# Patient Record
Sex: MALE | Race: BLACK OR AFRICAN AMERICAN | Employment: FULL TIME | URBAN - METROPOLITAN AREA
[De-identification: names, ages, dates, MRNs, and addresses within clinical notes are randomized per-mention and may not be internally consistent; named-entity substitution may affect disease eponyms.]

---

## 2018-04-13 ENCOUNTER — OFFICE VISIT (OUTPATIENT)
Dept: INTERNAL MEDICINE CLINIC | Age: 53
End: 2018-04-13

## 2018-04-13 VITALS
TEMPERATURE: 98.1 F | BODY MASS INDEX: 34.07 KG/M2 | HEIGHT: 69 IN | RESPIRATION RATE: 16 BRPM | HEART RATE: 78 BPM | DIASTOLIC BLOOD PRESSURE: 76 MMHG | SYSTOLIC BLOOD PRESSURE: 138 MMHG | OXYGEN SATURATION: 95 % | WEIGHT: 230 LBS

## 2018-04-13 DIAGNOSIS — Z23 ENCOUNTER FOR IMMUNIZATION: ICD-10-CM

## 2018-04-13 DIAGNOSIS — Z00.00 ROUTINE ADULT HEALTH MAINTENANCE: Primary | ICD-10-CM

## 2018-04-13 DIAGNOSIS — Z12.11 SCREEN FOR COLON CANCER: ICD-10-CM

## 2018-04-13 PROBLEM — E66.9 OBESITY (BMI 30-39.9): Chronic | Status: ACTIVE | Noted: 2018-04-13

## 2018-04-13 PROBLEM — Z80.42 FAMILY HISTORY OF PROSTATE CANCER IN FATHER: Chronic | Status: ACTIVE | Noted: 2018-04-13

## 2018-04-13 PROBLEM — C61 PROSTATE CANCER (HCC): Chronic | Status: RESOLVED | Noted: 2018-04-13 | Resolved: 2018-04-13

## 2018-04-13 PROBLEM — E66.9 OBESITY (BMI 30-39.9): Chronic | Status: RESOLVED | Noted: 2018-04-13 | Resolved: 2018-04-13

## 2018-04-13 PROBLEM — C61 PROSTATE CANCER (HCC): Chronic | Status: ACTIVE | Noted: 2018-04-13

## 2018-04-13 NOTE — LETTER
4/16/2018 3:51 PM 
 
Mr. Sherif Yan [address] [id number] Dear Sherif Yan: 
 
Please find your most recent results below. Resulted Orders CBC WITH AUTOMATED DIFF Result Value Ref Range WBC 3.5 3.4 - 10.8 x10E3/uL  
 RBC 4.20 4. 14 - 5.80 x10E6/uL HGB 12.7 (L) 13.0 - 17.7 g/dL HCT 38.4 37.5 - 51.0 % MCV 91 79 - 97 fL  
 MCH 30.2 26.6 - 33.0 pg  
 MCHC 33.1 31.5 - 35.7 g/dL  
 RDW 13.2 12.3 - 15.4 % PLATELET 108 370 - 394 x10E3/uL NEUTROPHILS 51 Not Estab. % Lymphocytes 38 Not Estab. % MONOCYTES 9 Not Estab. % EOSINOPHILS 1 Not Estab. % BASOPHILS 1 Not Estab. %  
 ABS. NEUTROPHILS 1.7 1.4 - 7.0 x10E3/uL Abs Lymphocytes 1.3 0.7 - 3.1 x10E3/uL  
 ABS. MONOCYTES 0.3 0.1 - 0.9 x10E3/uL  
 ABS. EOSINOPHILS 0.0 0.0 - 0.4 x10E3/uL  
 ABS. BASOPHILS 0.0 0.0 - 0.2 x10E3/uL IMMATURE GRANULOCYTES 0 Not Estab. %  
 ABS. IMM. GRANS. 0.0 0.0 - 0.1 x10E3/uL Narrative Performed at:  [address]  [id number] : Eveline Cortes MD, Phone:  [phone] METABOLIC PANEL, COMPREHENSIVE Result Value Ref Range Glucose 92 65 - 99 mg/dL BUN 17 6 - 24 mg/dL Creatinine 0.84 0.76 - 1.27 mg/dL GFR est non- >59 mL/min/1.73 GFR est  >59 mL/min/1.73  
 BUN/Creatinine ratio 20 9 - 20 Sodium 143 134 - 144 mmol/L Potassium 4.2 3.5 - 5.2 mmol/L Chloride 106 96 - 106 mmol/L  
 CO2 24 18 - 29 mmol/L Calcium 9.0 8.7 - 10.2 mg/dL Protein, total 7.1 6.0 - 8.5 g/dL Albumin 4.2 3.5 - 5.5 g/dL GLOBULIN, TOTAL 2.9 1.5 - 4.5 g/dL A-G Ratio 1.4 1.2 - 2.2 Bilirubin, total 0.5 0.0 - 1.2 mg/dL Alk. phosphatase 90 39 - 117 IU/L  
 AST (SGOT) 15 0 - 40 IU/L  
 ALT (SGPT) 19 0 - 44 IU/L Narrative Performed at:  [address]  [id number] : Eveline Cortes MD, Phone:  [phone] LIPID PANEL  
 Result Value Ref Range Cholesterol, total 174 100 - 199 mg/dL Triglyceride 50 0 - 149 mg/dL HDL Cholesterol 60 >39 mg/dL VLDL, calculated 10 5 - 40 mg/dL LDL, calculated 104 (H) 0 - 99 mg/dL Narrative Performed at:  [address]  [id number] : Viviane Mon MD, Phone:  [phone] TSH 3RD GENERATION Result Value Ref Range TSH 2.160 0.450 - 4.500 uIU/mL Narrative Performed at:  [address]  [id number] : Viviane Mon MD, Phone:  [phone] PSA, DIAGNOSTIC (PROSTATE SPECIFIC AG) Result Value Ref Range Prostate Specific Ag 0.6 0.0 - 4.0 ng/mL Comment:  
   Roche ECLIA methodology. According to the American Urological Association, Serum PSA should 
decrease and remain at undetectable levels after radical 
prostatectomy. The AUA defines biochemical recurrence as an initial 
PSA value 0.2 ng/mL or greater followed by a subsequent confirmatory PSA value 0.2 ng/mL or greater. Values obtained with different assay methods or kits cannot be used 
interchangeably. Results cannot be interpreted as absolute evidence 
of the presence or absence of malignant disease. Narrative Performed at:  [address]  [id number] : Viviane Mon MD, Phone:  [phone] RECOMMENDATIONS: 
Cholesterol levels up just a bit from few years ago, LDL up to 104, had been 98.  No need for meds at this time.  Eating oatmeal on regular basis often helps improve levels. Other labs all look good.  
    
   
 
 
Please call me if you have any questions: [phone] Sincerely, Collin Do III, DO

## 2018-04-13 NOTE — PROGRESS NOTES
Ligia Stephens is a 46 y.o. male who presents for evaluation of cpe. Last seen by me dec 30, 2015. Doing well, no complaints. ROS:  Constitutional: negative for fevers, chills, anorexia and weight loss  Eyes:   negative for visual disturbance and irritation  ENT:   negative for tinnitus,sore throat,nasal congestion,ear pain,hoarseness  Respiratory:  negative for cough, hemoptysis, dyspnea,wheezing  CV:   negative for chest pain, palpitations, lower extremity edema  GI:   negative for nausea, vomiting, diarrhea, abdominal pain,melena  Genitourinary: negative for frequency, dysuria and hematuria  Musculoskel: negative for myalgias, arthralgias, back pain, muscle weakness, joint pain  Neurological:  negative for headaches, dizziness, focal weakness, numbness  Psychiatric:     Negative for depression or anxiety      History reviewed. No pertinent past medical history. Past Surgical History:   Procedure Laterality Date    HX KNEE ARTHROSCOPY         Family History   Problem Relation Age of Onset    Cancer Mother      lung    Cancer Father      Prostate       Social History     Social History    Marital status:      Spouse name: N/A    Number of children: N/A    Years of education: N/A     Occupational History    Not on file.      Social History Main Topics    Smoking status: Never Smoker    Smokeless tobacco: Never Used    Alcohol use No    Drug use: No    Sexual activity: Yes     Other Topics Concern    Not on file     Social History Narrative            Visit Vitals    /76 (BP 1 Location: Left arm, BP Patient Position: Sitting)    Pulse 78    Temp 98.1 °F (36.7 °C) (Oral)    Resp 16    Ht 5' 9.29\" (1.76 m)    Wt 230 lb (104.3 kg)    SpO2 95%    BMI 33.68 kg/m2       Physical Examination:   General - Well appearing male  HEENT - PERRL, TM no erythema/opacification, normal nasal turbinates, no oropharyngeal erythema or exudate, MMM  Neck - supple, no bruits, no thyroidomegaly, no lymphadenopathy  Pulm - clear to auscultation bilaterally  Cardio - RRR, normal S1 S2, no murmur  Abd - soft, nontender, no masses, no HSM  Extrem - no edema, +2 distal pulses  Neuro-  No focal deficits, CN intact     Assessment/Plan:    1. Routine adult health maintenance--check cbc, cmp, flp, tsh, psa.  2.  Screen colon cancer--referral to gi, dr Becky Alvarenga  3. Family hx prostate cancer--in father, check psa    tdap given today. Had flu shot in the fall. rtc one year, sooner if labs abn.         Rosa M Ta III, DO

## 2018-04-13 NOTE — PROGRESS NOTES
Reviewed record in preparation for visit and have obtained necessary documentation. Identified pt with two pt identifiers(name and ). Chief Complaint   Patient presents with    Annual Exam       Health Maintenance Due   Topic Date Due    DTaP/Tdap/Td series (1 - Tdap) 1986    FOBT Q 1 YEAR AGE 50-75  2015    Influenza Age 5 to Adult  2017       Mr. Adolph Mathis has a reminder for a \"due or due soon\" health maintenance. I have asked that he discuss health maintenance topic(s) due with His  primary care provider. Coordination of Care Questionnaire:  :     1) Have you been to an emergency room, urgent care clinic since your last visit? no   Hospitalized since your last visit? no             2) Have you seen or consulted any other health care providers outside of [address] [place] [address] since your last visit? no  (Include any pap smears or colon screenings in this section.)    3) Do you have an Advance Directive on file? no    4) Are you interested in receiving information on Advance Directives? NO    Patient is accompanied by self I have received verbal consent from Nelly Lopez to discuss any/all medical information while they are present in the room.

## 2018-04-13 NOTE — PATIENT INSTRUCTIONS
Colon Cancer Screening: Care Instructions  Your Care Instructions    Colorectal cancer occurs in the colon or rectum. That's the lower part of your digestive system. It is the second-leading cause of cancer deaths in the [place]. It often starts with small growths called polyps in the colon or rectum. Polyps are usually found with screening tests. Depending on the type of test, any polyps found may be removed during the tests. Colorectal cancer usually does not cause symptoms at first. But regular tests can help find it early, before it spreads and becomes harder to treat. Experts advise routine tests for colon cancer for people starting at age 48. And they advise people with a higher risk of colon cancer to get tested sooner. Talk with your doctor about when you should start testing. Discuss which tests you need. Follow-up care is a key part of your treatment and safety. Be sure to make and go to all appointments, and call your doctor if you are having problems. It's also a good idea to know your test results and keep a list of the medicines you take. What are the main screening tests for colon cancer? · Stool tests. These include the fecal immunochemical test (FIT) and the fecal occult blood test (FOBT). These tests check stool samples for signs of cancer. If your test is positive, you will need to have a colonoscopy. · Sigmoidoscopy. This test lets your doctor look at the lining of your rectum and the lowest part of your colon. Your doctor uses a lighted tube called a sigmoidoscope. This test can't find cancers or polyps in the upper part of your colon. In some cases, polyps that are found can be removed. But if your doctor finds polyps, you will need to have a colonoscopy to check the upper part of your colon. · Colonoscopy. This test lets your doctor look at the lining of your rectum and your entire colon. The doctor uses a thin, flexible tool called a colonoscope.  It can also be used to remove polyps or get a tissue sample (biopsy). What tests do you need? The following guidelines are for people age 48 and over who are not at high risk for colorectal cancer. You may have at least one of these tests as directed by your doctor. · Fecal immunochemical test (FIT) or fecal occult blood test (FOBT) every year  · Sigmoidoscopy every 5 years  · Colonoscopy every 10 years  If you are age 68 to 80, you can work with your doctor to decide if screening is a good option. If you are age 80 or older, your doctor will likely advise that screening is not helpful. Talk with your doctor about when you need to be tested. And discuss which tests are right for you. Your doctor may recommend earlier or more frequent testing if you:  · Have had colorectal cancer before. · Have had colon polyps. · Have symptoms of colorectal cancer. These include blood in your stool and changes in your bowel habits. · Have a parent, brother or sister, or child with colon polyps or colorectal cancer. · Have a bowel disease. This includes ulcerative colitis and Crohn's disease. · Have a rare polyp syndrome that runs in families, such as familial adenomatous polyposis (FAP). · Have had radiation treatments to the belly or pelvis. When should you call for help? Watch closely for changes in your health, and be sure to contact your doctor if:  ? · You have any changes in your bowel habits. ? · You have any problems. Where can you learn more? Go to http://rodriguez-king.info/. Enter M541 in the search box to learn more about \"Colon Cancer Screening: Care Instructions. \"  Current as of: May 12, 2017  Content Version: 11.4  © 2271-7062 [employer]. Care instructions adapted under license by [employer] (which disclaims liability or warranty for this information).  If you have questions about a medical condition or this instruction, always ask your healthcare professional. [employer] disclaims any warranty or liability for your use of this information. Well Visit, Men 48 to 72: Care Instructions  Your Care Instructions    Physical exams can help you stay healthy. Your doctor has checked your overall health and may have suggested ways to take good care of yourself. He or she also may have recommended tests. At home, you can help prevent illness with healthy eating, regular exercise, and other steps. Follow-up care is a key part of your treatment and safety. Be sure to make and go to all appointments, and call your doctor if you are having problems. It's also a good idea to know your test results and keep a list of the medicines you take. How can you care for yourself at home? · Reach and stay at a healthy weight. This will lower your risk for many problems, such as obesity, diabetes, heart disease, and high blood pressure. · Get at least 30 minutes of exercise on most days of the week. Walking is a good choice. You also may want to do other activities, such as running, swimming, cycling, or playing tennis or team sports. · Do not smoke. Smoking can make health problems worse. If you need help quitting, talk to your doctor about stop-smoking programs and medicines. These can increase your chances of quitting for good. · Protect your skin from too much sun. When you're outdoors from 10 a.m. to 4 p.m., stay in the shade or cover up with clothing and a hat with a wide brim. Wear sunglasses that block UV rays. Even when it's cloudy, put broad-spectrum sunscreen (SPF 30 or higher) on any exposed skin. · See a dentist one or two times a year for checkups and to have your teeth cleaned. · Wear a seat belt in the car. · Limit alcohol to 2 drinks a day. Too much alcohol can cause health problems. Follow your doctor's advice about when to have certain tests. These tests can spot problems early. · Cholesterol.  Your doctor will tell you how often to have this done based on your overall health and other things that can increase your risk for heart attack and stroke. · Blood pressure. Have your blood pressure checked during a routine doctor visit. Your doctor will tell you how often to check your blood pressure based on your age, your blood pressure results, and other factors. · Prostate exam. Talk to your doctor about whether you should have a blood test (called a PSA test) for prostate cancer. Experts disagree on whether men should have this test. Some experts recommend that you discuss the benefits and risks of the test with your doctor. · Diabetes. Ask your doctor whether you should have tests for diabetes. · Vision. Some experts recommend that you have yearly exams for glaucoma and other age-related eye problems starting at age 48. · Hearing. Tell your doctor if you notice any change in your hearing. You can have tests to find out how well you hear. · Colon cancer. You should begin tests for colon cancer at age 48. You may have one of several tests. Your doctor will tell you how often to have tests based on your age and risk. Risks include whether you already had a precancerous polyp removed from your colon or whether your parent, brother, sister, or child has had colon cancer. · Heart attack and stroke risk. At least every 4 to 6 years, you should have your risk for heart attack and stroke assessed. Your doctor uses factors such as your age, blood pressure, cholesterol, and whether you smoke or have diabetes to show what your risk for a heart attack or stroke is over the next 10 years. · Abdominal aortic aneurysm. Ask your doctor whether you should have a test to check for an aneurysm. You may need a test if you ever smoked or if your parent, brother, sister, or child has had an aneurysm. When should you call for help? Watch closely for changes in your health, and be sure to contact your doctor if you have any problems or symptoms that concern you. Where can you learn more?   Go to http://rodriguez-king.info/. Enter G596 in the search box to learn more about \"Well Visit, Men 48 to 72: Care Instructions. \"  Current as of: May 12, 2017  Content Version: 11.4  © 3061-5985 [employer]. Care instructions adapted under license by [employer] (which disclaims liability or warranty for this information). If you have questions about a medical condition or this instruction, always ask your healthcare professional. [place] [address] any warranty or liability for your use of this information.

## 2018-04-14 LAB
ALBUMIN SERPL-MCNC: 4.2 G/DL (ref 3.5–5.5)
ALBUMIN/GLOB SERPL: 1.4 {RATIO} (ref 1.2–2.2)
ALP SERPL-CCNC: 90 IU/L (ref 39–117)
ALT SERPL-CCNC: 19 IU/L (ref 0–44)
AST SERPL-CCNC: 15 IU/L (ref 0–40)
BASOPHILS # BLD AUTO: 0 X10E3/UL (ref 0–0.2)
BASOPHILS NFR BLD AUTO: 1 %
BILIRUB SERPL-MCNC: 0.5 MG/DL (ref 0–1.2)
BUN SERPL-MCNC: 17 MG/DL (ref 6–24)
BUN/CREAT SERPL: 20 (ref 9–20)
CALCIUM SERPL-MCNC: 9 MG/DL (ref 8.7–10.2)
CHLORIDE SERPL-SCNC: 106 MMOL/L (ref 96–106)
CHOLEST SERPL-MCNC: 174 MG/DL (ref 100–199)
CO2 SERPL-SCNC: 24 MMOL/L (ref 18–29)
CREAT SERPL-MCNC: 0.84 MG/DL (ref 0.76–1.27)
EOSINOPHIL # BLD AUTO: 0 X10E3/UL (ref 0–0.4)
EOSINOPHIL NFR BLD AUTO: 1 %
ERYTHROCYTE [DISTWIDTH] IN BLOOD BY AUTOMATED COUNT: 13.2 % (ref 12.3–15.4)
GFR SERPLBLD CREATININE-BSD FMLA CKD-EPI: 101 ML/MIN/1.73
GFR SERPLBLD CREATININE-BSD FMLA CKD-EPI: 116 ML/MIN/1.73
GLOBULIN SER CALC-MCNC: 2.9 G/DL (ref 1.5–4.5)
GLUCOSE SERPL-MCNC: 92 MG/DL (ref 65–99)
HCT VFR BLD AUTO: 38.4 % (ref 37.5–51)
HDLC SERPL-MCNC: 60 MG/DL
HGB BLD-MCNC: 12.7 G/DL (ref 13–17.7)
IMM GRANULOCYTES # BLD: 0 X10E3/UL (ref 0–0.1)
IMM GRANULOCYTES NFR BLD: 0 %
LDLC SERPL CALC-MCNC: 104 MG/DL (ref 0–99)
LYMPHOCYTES # BLD AUTO: 1.3 X10E3/UL (ref 0.7–3.1)
LYMPHOCYTES NFR BLD AUTO: 38 %
MCH RBC QN AUTO: 30.2 PG (ref 26.6–33)
MCHC RBC AUTO-ENTMCNC: 33.1 G/DL (ref 31.5–35.7)
MCV RBC AUTO: 91 FL (ref 79–97)
MONOCYTES # BLD AUTO: 0.3 X10E3/UL (ref 0.1–0.9)
MONOCYTES NFR BLD AUTO: 9 %
NEUTROPHILS # BLD AUTO: 1.7 X10E3/UL (ref 1.4–7)
NEUTROPHILS NFR BLD AUTO: 51 %
PLATELET # BLD AUTO: 187 X10E3/UL (ref 150–379)
POTASSIUM SERPL-SCNC: 4.2 MMOL/L (ref 3.5–5.2)
PROT SERPL-MCNC: 7.1 G/DL (ref 6–8.5)
PSA SERPL-MCNC: 0.6 NG/ML (ref 0–4)
RBC # BLD AUTO: 4.2 X10E6/UL (ref 4.14–5.8)
SODIUM SERPL-SCNC: 143 MMOL/L (ref 134–144)
TRIGL SERPL-MCNC: 50 MG/DL (ref 0–149)
TSH SERPL DL<=0.005 MIU/L-ACNC: 2.16 UIU/ML (ref 0.45–4.5)
VLDLC SERPL CALC-MCNC: 10 MG/DL (ref 5–40)
WBC # BLD AUTO: 3.5 X10E3/UL (ref 3.4–10.8)

## 2018-04-15 NOTE — PROGRESS NOTES
Cholesterol levels up just a bit from few years ago, LDL up to 104, had been 98. No need for meds at this time. Eating oatmeal on regular basis often helps improve levels. Other labs all look good.

## 2018-04-16 NOTE — PROGRESS NOTES
I have attempted to contact this patient by phone with the following results:   Cholesterol levels up just a bit from few years ago, LDL up to 104, had been 98.  No need for meds at this time.  Eating oatmeal on regular basis often helps improve levels.    Other labs all look good.       Results mailed to patient's home.

## 2019-02-10 ENCOUNTER — APPOINTMENT (OUTPATIENT)
Dept: CT IMAGING | Age: 54
End: 2019-02-10
Attending: EMERGENCY MEDICINE
Payer: COMMERCIAL

## 2019-02-10 ENCOUNTER — HOSPITAL ENCOUNTER (EMERGENCY)
Age: 54
Discharge: HOME | End: 2019-02-10
Attending: EMERGENCY MEDICINE
Payer: COMMERCIAL

## 2019-02-10 VITALS
HEART RATE: 72 BPM | BODY MASS INDEX: 32.7 KG/M2 | TEMPERATURE: 97.9 F | HEIGHT: 70 IN | RESPIRATION RATE: 12 BRPM | WEIGHT: 228.4 LBS | OXYGEN SATURATION: 100 % | SYSTOLIC BLOOD PRESSURE: 135 MMHG | DIASTOLIC BLOOD PRESSURE: 100 MMHG

## 2019-02-10 DIAGNOSIS — R31.9 HEMATURIA, UNSPECIFIED TYPE: Primary | ICD-10-CM

## 2019-02-10 LAB
ALBUMIN SERPL-MCNC: 3.8 G/DL (ref 3.5–5)
ALBUMIN/GLOB SERPL: 1 {RATIO} (ref 1.1–2.2)
ALP SERPL-CCNC: 97 U/L (ref 45–117)
ALT SERPL-CCNC: 31 U/L (ref 12–78)
ANION GAP SERPL CALC-SCNC: 6 MMOL/L (ref 5–15)
APPEARANCE UR: ABNORMAL
AST SERPL-CCNC: 13 U/L (ref 15–37)
BACTERIA URNS QL MICRO: NEGATIVE /HPF
BASOPHILS # BLD: 0 K/UL (ref 0–0.1)
BASOPHILS NFR BLD: 1 % (ref 0–1)
BILIRUB SERPL-MCNC: 0.9 MG/DL (ref 0.2–1)
BILIRUB UR QL: NEGATIVE
BUN SERPL-MCNC: 14 MG/DL (ref 6–20)
BUN/CREAT SERPL: 18 (ref 12–20)
CALCIUM SERPL-MCNC: 8.7 MG/DL (ref 8.5–10.1)
CHLORIDE SERPL-SCNC: 105 MMOL/L (ref 97–108)
CO2 SERPL-SCNC: 27 MMOL/L (ref 21–32)
COLOR UR: ABNORMAL
CREAT SERPL-MCNC: 0.8 MG/DL (ref 0.7–1.3)
DIFFERENTIAL METHOD BLD: ABNORMAL
EOSINOPHIL # BLD: 0 K/UL (ref 0–0.4)
EOSINOPHIL NFR BLD: 1 % (ref 0–7)
EPITH CASTS URNS QL MICRO: ABNORMAL /LPF
ERYTHROCYTE [DISTWIDTH] IN BLOOD BY AUTOMATED COUNT: 11.9 % (ref 11.5–14.5)
GLOBULIN SER CALC-MCNC: 3.8 G/DL (ref 2–4)
GLUCOSE SERPL-MCNC: 89 MG/DL (ref 65–100)
GLUCOSE UR STRIP.AUTO-MCNC: NEGATIVE MG/DL
HCT VFR BLD AUTO: 38.7 % (ref 36.6–50.3)
HGB BLD-MCNC: 13.5 G/DL (ref 12.1–17)
HGB UR QL STRIP: ABNORMAL
IMM GRANULOCYTES # BLD AUTO: 0 K/UL (ref 0–0.04)
IMM GRANULOCYTES NFR BLD AUTO: 0 % (ref 0–0.5)
KETONES UR QL STRIP.AUTO: NEGATIVE MG/DL
LEUKOCYTE ESTERASE UR QL STRIP.AUTO: NEGATIVE
LYMPHOCYTES # BLD: 1.2 K/UL (ref 0.8–3.5)
LYMPHOCYTES NFR BLD: 36 % (ref 12–49)
MCH RBC QN AUTO: 31.2 PG (ref 26–34)
MCHC RBC AUTO-ENTMCNC: 34.9 G/DL (ref 30–36.5)
MCV RBC AUTO: 89.4 FL (ref 80–99)
MONOCYTES # BLD: 0.2 K/UL (ref 0–1)
MONOCYTES NFR BLD: 6 % (ref 5–13)
NEUTS SEG # BLD: 1.9 K/UL (ref 1.8–8)
NEUTS SEG NFR BLD: 57 % (ref 32–75)
NITRITE UR QL STRIP.AUTO: NEGATIVE
NRBC # BLD: 0 K/UL (ref 0–0.01)
NRBC BLD-RTO: 0 PER 100 WBC
PH UR STRIP: 5 [PH] (ref 5–8)
PLATELET # BLD AUTO: 169 K/UL (ref 150–400)
PMV BLD AUTO: 10 FL (ref 8.9–12.9)
POTASSIUM SERPL-SCNC: 4.2 MMOL/L (ref 3.5–5.1)
PROT SERPL-MCNC: 7.6 G/DL (ref 6.4–8.2)
PROT UR STRIP-MCNC: 100 MG/DL
RBC # BLD AUTO: 4.33 M/UL (ref 4.1–5.7)
RBC #/AREA URNS HPF: >100 /HPF (ref 0–5)
SODIUM SERPL-SCNC: 138 MMOL/L (ref 136–145)
SP GR UR REFRACTOMETRY: 1.01 (ref 1–1.03)
UA: UC IF INDICATED,UAUC: ABNORMAL
UROBILINOGEN UR QL STRIP.AUTO: 0.2 EU/DL (ref 0.2–1)
WBC # BLD AUTO: 3.4 K/UL (ref 4.1–11.1)
WBC URNS QL MICRO: ABNORMAL /HPF (ref 0–4)

## 2019-02-10 PROCEDURE — 74011250636 HC RX REV CODE- 250/636: Performed by: EMERGENCY MEDICINE

## 2019-02-10 PROCEDURE — 85025 COMPLETE CBC W/AUTO DIFF WBC: CPT

## 2019-02-10 PROCEDURE — 36415 COLL VENOUS BLD VENIPUNCTURE: CPT

## 2019-02-10 PROCEDURE — 96361 HYDRATE IV INFUSION ADD-ON: CPT

## 2019-02-10 PROCEDURE — 74177 CT ABD & PELVIS W/CONTRAST: CPT

## 2019-02-10 PROCEDURE — 74011636320 HC RX REV CODE- 636/320: Performed by: EMERGENCY MEDICINE

## 2019-02-10 PROCEDURE — 99283 EMERGENCY DEPT VISIT LOW MDM: CPT

## 2019-02-10 PROCEDURE — 81001 URINALYSIS AUTO W/SCOPE: CPT

## 2019-02-10 PROCEDURE — 80053 COMPREHEN METABOLIC PANEL: CPT

## 2019-02-10 PROCEDURE — 96360 HYDRATION IV INFUSION INIT: CPT

## 2019-02-10 RX ORDER — CEPHALEXIN 500 MG/1
500 CAPSULE ORAL 3 TIMES DAILY
Qty: 15 CAP | Refills: 0 | Status: SHIPPED | OUTPATIENT
Start: 2019-02-10 | End: 2022-10-14

## 2019-02-10 RX ORDER — CEPHALEXIN 500 MG/1
500 CAPSULE ORAL 3 TIMES DAILY
Qty: 15 CAP | Refills: 0 | Status: SHIPPED | OUTPATIENT
Start: 2019-02-10 | End: 2019-02-10

## 2019-02-10 RX ORDER — SODIUM CHLORIDE 0.9 % (FLUSH) 0.9 %
10 SYRINGE (ML) INJECTION
Status: COMPLETED | OUTPATIENT
Start: 2019-02-10 | End: 2019-02-10

## 2019-02-10 RX ORDER — SODIUM CHLORIDE 0.9 % (FLUSH) 0.9 %
5-40 SYRINGE (ML) INJECTION EVERY 8 HOURS
Status: DISCONTINUED | OUTPATIENT
Start: 2019-02-10 | End: 2019-02-10 | Stop reason: HOSPADM

## 2019-02-10 RX ORDER — SODIUM CHLORIDE 0.9 % (FLUSH) 0.9 %
5-40 SYRINGE (ML) INJECTION AS NEEDED
Status: DISCONTINUED | OUTPATIENT
Start: 2019-02-10 | End: 2019-02-10 | Stop reason: HOSPADM

## 2019-02-10 RX ADMIN — Medication 10 ML: at 14:16

## 2019-02-10 RX ADMIN — SODIUM CHLORIDE 1000 ML: 900 INJECTION, SOLUTION INTRAVENOUS at 13:47

## 2019-02-10 RX ADMIN — IOPAMIDOL 100 ML: 755 INJECTION, SOLUTION INTRAVENOUS at 14:16

## 2019-02-10 NOTE — ED PROVIDER NOTES
EMERGENCY DEPARTMENT HISTORY AND PHYSICAL EXAM      Date: 2/10/2019  Patient Name: Renetta Cueva    History of Presenting Illness     Chief Complaint   Patient presents with    Blood in Urine     Pt reports blood in his urine since yesterday       History Provided By: Patient    HPI: Renetta Cueva, 48 y.o. male with no significant PMHx, presents ambulatory to the ED with cc of hematuria since yesterday. Patient denies dysuria, fever, chills, NVD, abdominal pain, testicular pain, testicular swelling, rectal pain, blood thinner use, or FHx of renal cell carcinoma. He reports he went to urgent care yesterday for same complaint and was sent home. There are no other complaints, changes, or physical findings at this time. PCP: Kiki Srivastava, DO    No current facility-administered medications on file prior to encounter. Current Outpatient Medications on File Prior to Encounter   Medication Sig Dispense Refill    omega 3-dha-epa-fish oil (FISH OIL) 100-160-1,000 mg cap Take  by mouth.  MULTIVITAMIN PO Take  by mouth. Past History     Past Medical History:  History reviewed. No pertinent past medical history. Past Surgical History:  Past Surgical History:   Procedure Laterality Date    HX KNEE ARTHROSCOPY         Family History:  Family History   Problem Relation Age of Onset    Cancer Mother         lung    Cancer Father         Prostate       Social History:  Social History     Tobacco Use    Smoking status: Never Smoker    Smokeless tobacco: Never Used   Substance Use Topics    Alcohol use: No    Drug use: No       Allergies:  No Known Allergies      Review of Systems   Review of Systems   Constitutional: Negative. Negative for appetite change, chills, fatigue and fever. HENT: Negative. Negative for congestion, rhinorrhea, sinus pressure and sore throat. Eyes: Negative. Respiratory: Negative.   Negative for cough, choking, chest tightness, shortness of breath and wheezing. Cardiovascular: Negative. Negative for chest pain, palpitations and leg swelling. Gastrointestinal: Negative for abdominal pain, constipation, diarrhea, nausea and vomiting. Endocrine: Negative. Genitourinary: Positive for hematuria. Negative for difficulty urinating, dysuria, flank pain and urgency. Musculoskeletal: Negative. Skin: Negative. Neurological: Negative. Negative for dizziness, speech difficulty, weakness, light-headedness, numbness and headaches. Psychiatric/Behavioral: Negative. All other systems reviewed and are negative. Physical Exam   Physical Exam   Constitutional: He is oriented to person, place, and time. He appears well-developed and well-nourished. No distress. HENT:   Head: Normocephalic and atraumatic. Mouth/Throat: Oropharynx is clear and moist. No oropharyngeal exudate. Eyes: Conjunctivae and EOM are normal. Pupils are equal, round, and reactive to light. Neck: Normal range of motion. Neck supple. No JVD present. No tracheal deviation present. Cardiovascular: Normal rate, regular rhythm, normal heart sounds and intact distal pulses. No murmur heard. Pulmonary/Chest: Effort normal and breath sounds normal. No stridor. No respiratory distress. He has no wheezes. He has no rales. He exhibits no tenderness. Abdominal: Soft. He exhibits no distension. There is no tenderness. There is no rebound and no guarding. Musculoskeletal: Normal range of motion. He exhibits no edema or tenderness. Neurological: He is alert and oriented to person, place, and time. No cranial nerve deficit. No gross motor or sensory deficits    Skin: Skin is warm and dry. He is not diaphoretic. Psychiatric: He has a normal mood and affect. His behavior is normal.   Nursing note and vitals reviewed.       Diagnostic Study Results     Labs -     Recent Results (from the past 12 hour(s))   URINALYSIS W/ REFLEX CULTURE    Collection Time: 02/10/19 11:23 AM   Result Value Ref Range    Color RED      Appearance TURBID (A) CLEAR      Specific gravity 1.015 1.003 - 1.030      pH (UA) 5.0 5.0 - 8.0      Protein 100 (A) NEG mg/dL    Glucose NEGATIVE  NEG mg/dL    Ketone NEGATIVE  NEG mg/dL    Bilirubin NEGATIVE  NEG      Blood LARGE (A) NEG      Urobilinogen 0.2 0.2 - 1.0 EU/dL    Nitrites NEGATIVE  NEG      Leukocyte Esterase NEGATIVE  NEG      WBC 0-4 0 - 4 /hpf    RBC >100 (H) 0 - 5 /hpf    Epithelial cells FEW FEW /lpf    Bacteria NEGATIVE  NEG /hpf    UA:UC IF INDICATED CULTURE NOT INDICATED BY UA RESULT CNI     CBC WITH AUTOMATED DIFF    Collection Time: 02/10/19  1:04 PM   Result Value Ref Range    WBC 3.4 (L) 4.1 - 11.1 K/uL    RBC 4.33 4.10 - 5.70 M/uL    HGB 13.5 12.1 - 17.0 g/dL    HCT 38.7 36.6 - 50.3 %    MCV 89.4 80.0 - 99.0 FL    MCH 31.2 26.0 - 34.0 PG    MCHC 34.9 30.0 - 36.5 g/dL    RDW 11.9 11.5 - 14.5 %    PLATELET 735 839 - 453 K/uL    MPV 10.0 8.9 - 12.9 FL    NRBC 0.0 0  WBC    ABSOLUTE NRBC 0.00 0.00 - 0.01 K/uL    NEUTROPHILS 57 32 - 75 %    LYMPHOCYTES 36 12 - 49 %    MONOCYTES 6 5 - 13 %    EOSINOPHILS 1 0 - 7 %    BASOPHILS 1 0 - 1 %    IMMATURE GRANULOCYTES 0 0.0 - 0.5 %    ABS. NEUTROPHILS 1.9 1.8 - 8.0 K/UL    ABS. LYMPHOCYTES 1.2 0.8 - 3.5 K/UL    ABS. MONOCYTES 0.2 0.0 - 1.0 K/UL    ABS. EOSINOPHILS 0.0 0.0 - 0.4 K/UL    ABS. BASOPHILS 0.0 0.0 - 0.1 K/UL    ABS. IMM.  GRANS. 0.0 0.00 - 0.04 K/UL    DF AUTOMATED     METABOLIC PANEL, COMPREHENSIVE    Collection Time: 02/10/19  1:04 PM   Result Value Ref Range    Sodium 138 136 - 145 mmol/L    Potassium 4.2 3.5 - 5.1 mmol/L    Chloride 105 97 - 108 mmol/L    CO2 27 21 - 32 mmol/L    Anion gap 6 5 - 15 mmol/L    Glucose 89 65 - 100 mg/dL    BUN 14 6 - 20 MG/DL    Creatinine 0.80 0.70 - 1.30 MG/DL    BUN/Creatinine ratio 18 12 - 20      GFR est AA >60 >60 ml/min/1.73m2    GFR est non-AA >60 >60 ml/min/1.73m2    Calcium 8.7 8.5 - 10.1 MG/DL    Bilirubin, total 0.9 0.2 - 1.0 MG/DL    ALT (SGPT) 31 12 - 78 U/L    AST (SGOT) 13 (L) 15 - 37 U/L    Alk. phosphatase 97 45 - 117 U/L    Protein, total 7.6 6.4 - 8.2 g/dL    Albumin 3.8 3.5 - 5.0 g/dL    Globulin 3.8 2.0 - 4.0 g/dL    A-G Ratio 1.0 (L) 1.1 - 2.2         Radiologic Studies -   CT ABD PELV W CONT   Final Result   IMPRESSION:   No evidence of acute process. CT Results  (Last 48 hours)               02/10/19 1417  CT ABD PELV W CONT Final result    Impression:  IMPRESSION:   No evidence of acute process. Narrative:  EXAM: CT ABD PELV W CONT       INDICATION: painless hematuria       COMPARISON: None        CONTRAST: 100 mL of Isovue-370. TECHNIQUE:    Following the uneventful intravenous administration of contrast, thin axial   images were obtained through the abdomen and pelvis. Coronal and sagittal   reconstructions were generated. Oral contrast was not administered. CT dose   reduction was achieved through use of a standardized protocol tailored for this   examination and automatic exposure control for dose modulation. FINDINGS:    LUNG BASES: Clear. INCIDENTALLY IMAGED HEART AND MEDIASTINUM: Unremarkable. LIVER: No mass or biliary dilatation. GALLBLADDER: Unremarkable. SPLEEN: No mass. PANCREAS: No mass or ductal dilatation. ADRENALS: Unremarkable. KIDNEYS: Several small hypodensities in the kidneys are less than 5 mm in size   likely represent incidental small cysts. No mass, calculus, or hydronephrosis. STOMACH: Unremarkable. SMALL BOWEL: No dilatation or wall thickening. COLON: No dilatation or wall thickening. APPENDIX: Unremarkable. PERITONEUM: No ascites or pneumoperitoneum. RETROPERITONEUM: No lymphadenopathy or aortic aneurysm. REPRODUCTIVE ORGANS: Unremarkable   URINARY BLADDER: No mass or calculus. BONES: A small focus of avascular necrosis is seen in the anterior superior   right femoral head.    ADDITIONAL COMMENTS: N/A               CXR Results  (Last 48 hours)    None            Medical Decision Making   I am the first provider for this patient. I reviewed the vital signs, available nursing notes, past medical history, past surgical history, family history and social history. Vital Signs-Reviewed the patient's vital signs. Patient Vitals for the past 12 hrs:   Temp Pulse Resp BP SpO2   02/10/19 1430 -- -- -- (!) 135/100 100 %   02/10/19 1232 -- -- -- -- 100 %   02/10/19 1117 97.9 °F (36.6 °C) 72 12 144/86 100 %       Pulse Oximetry Analysis - 100% on RA    Cardiac Monitor:   Rate: 72 bpm  Rhythm: Normal Sinus Rhythm      Records Reviewed: Nursing Notes, Old medical records    Provider Notes (Medical Decision Making):   DDx: UTI, renal cell carcinoma, bladder cancer    ED Course:   Initial assessment performed. The patients presenting problems have been discussed, and they are in agreement with the care plan formulated and outlined with them. I have encouraged them to ask questions as they arise throughout their visit. Critical Care Time:   0 minutes    Disposition:  DISCHARGE NOTE:  Discussed with pt f/u with Urology, given gross hematuria without source. No sig mass seen of kidney's on CT. Pt does perform heavy lifting at work however in [address] there are red cells present as well. Urine here cloudy not gross bloody and seems to be improving. Ua results from [facility] does have WBCs as well, will write Rx for Ab to cover for UTI. The patient is ready for discharge. The patients signs, symptoms, diagnosis, and instructions for discharge have been discussed and the pt has conveyed their understanding. The patient is to follow up as recommended or return to the ER should their symptoms worsen. Plan has been discussed and patient has conveyed their agreement. PLAN:  1. Current Discharge Medication List        2. Follow-up Information    None       Return to ED if worse     Diagnosis     Clinical Impression: No diagnosis found. Attestations:     This note is prepared by Sandip Eh Tavarez, acting as Scribe for DO Army Stefanie Plata DO: The scribe's documentation has been prepared under my direction and personally reviewed by me in its entirety. I confirm that the note above accurately reflects all work, treatment, procedures, and medical decision making performed by me.

## 2019-02-10 NOTE — ED NOTES
Pt discharged by Dr Valeria Phoenix. Pt provided with discharge instructions Rx and instructions on follow up care. Pt out of ED ambulatory without difficulty accompanied by family.

## 2019-02-10 NOTE — DISCHARGE INSTRUCTIONS
Patient Education        Blood in the Urine: Care Instructions  Your Care Instructions    Blood in the urine, or hematuria, may make the urine look red, brown, or pink. There may be blood every time you urinate or just from time to time. You cannot always see blood in the urine, but it will show up in a urine test.  Blood in the urine may be serious. It should always be checked by a doctor. Your doctor may recommend more tests, including an X-ray, a CT scan, or a cystoscopy (which lets a doctor look inside the urethra and bladder). Blood in the urine can be a sign of another problem. Common causes are bladder infections and kidney stones. An injury to your groin or your genital area can also cause bleeding in the urinary tract. Very hard exercise--such as running a marathon--can cause blood in the urine. Blood in the urine can also be a sign of kidney disease or cancer in the bladder or kidney. Many cases of blood in the urine are caused by a harmless condition that runs in families. This is called benign familial hematuria. It does not need any treatment. Sometimes your urine may look red or brown even though it does not contain blood. For example, not getting enough fluids (dehydration), taking certain medicines, or having a liver problem can change the color of your urine. Eating foods such as beets, rhubarb, or blackberries or foods with red food coloring can make your urine look red or pink. Follow-up care is a key part of your treatment and safety. Be sure to make and go to all appointments, and call your doctor if you are having problems. It's also a good idea to know your test results and keep a list of the medicines you take. When should you call for help? Call your doctor now or seek immediate medical care if:    · You have symptoms of a urinary infection. For example:  ? You have pus in your urine. ? You have pain in your back just below your rib cage. This is called flank pain. ?  You have a fever, chills, or body aches. ? It hurts to urinate. ? You have groin or belly pain.     · You have more blood in your urine.    Watch closely for changes in your health, and be sure to contact your doctor if:    · You have new urination problems.     · You do not get better as expected. Where can you learn more? Go to http://rodriguez-king.info/. Enter A686 in the search box to learn more about \"Blood in the Urine: Care Instructions. \"  Current as of: March 20, 2018  Content Version: 11.9  © 1854-0225 [employer]. Care instructions adapted under license by [employer] (which disclaims liability or warranty for this information). If you have questions about a medical condition or this instruction, always ask your healthcare professional. [address] any warranty or liability for your use of this information.

## 2022-03-18 PROBLEM — Z80.42 FAMILY HISTORY OF PROSTATE CANCER IN FATHER: Status: ACTIVE | Noted: 2018-04-13

## 2022-04-15 ENCOUNTER — OFFICE VISIT (OUTPATIENT)
Dept: ORTHOPEDIC SURGERY | Age: 57
End: 2022-04-15
Payer: OTHER MISCELLANEOUS

## 2022-04-15 VITALS — HEIGHT: 70 IN | BODY MASS INDEX: 32.21 KG/M2 | WEIGHT: 225 LBS

## 2022-04-15 DIAGNOSIS — G56.23 CUBITAL TUNNEL SYNDROME OF BOTH UPPER EXTREMITIES: ICD-10-CM

## 2022-04-15 DIAGNOSIS — R20.0 BILATERAL HAND NUMBNESS: Primary | ICD-10-CM

## 2022-04-15 DIAGNOSIS — M65.342 TRIGGER FINGER, LEFT RING FINGER: ICD-10-CM

## 2022-04-15 PROCEDURE — 99203 OFFICE O/P NEW LOW 30 MIN: CPT | Performed by: ORTHOPAEDIC SURGERY

## 2022-04-15 NOTE — PROGRESS NOTES
Chief Complaint   Patient presents with    Hand Pain     On 1/10/22 while working with chris knife started having pain, numbness, & stiffness in bilateral little fingers, Was sent to [facility] after incident

## 2022-04-15 NOTE — PROGRESS NOTES
Lorena Sun (: 1965) is a 64 y.o. male patient here for evaluation of the following chief complaint(s):  Hand Pain (On 1/10/22 while working with chris knife started having pain, numbness, & stiffness in bilateral little fingers, Was sent to [facility] after incident)       ASSESSMENT/PLAN:  Below is the assessment and plan developed based on review of pertinent history, physical exam, labs, studies, and medications. 1. Bilateral hand numbness  2. Cubital tunnel syndrome of both upper extremities  -     EMG TWO EXTREMITIES UPPER; Future      I discussed treatment options with the patient and he does have Shore grade 3 changes on his exam.  We discussed the need for upper extremity EMG to evaluate the severity and potentially monitor for progress postoperatively. He will follow-up after that test to discuss further treatment options. For the left ring trigger finger we discussed steroid injection or surgical release at the time of surgery of his cubital tunnel. Patient verbalized understanding and elected to proceed. All questions were answered to the patient's apparent satisfaction. 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 He states that since then he has had numbness and tingling in the bilateral hands as well as weakness in the hands and numbness and stiffness. He is also noticed over the last few months his left ring finger will lock and he has to use the other digits to unlock it. He states that this all began around January 10, 2022 and did not notice symptoms prior to this. Patient reports a sudden onset of symptoms. Duration of problem 3 months, began 1/10/2022.   Symptom Severity 7/10  Symptom Frequency constant        No Known Allergies    Current Outpatient Medications   Medication Sig    omega 3-dha-epa-fish oil (FISH OIL) 100-160-1,000 mg cap Take  by mouth. (Patient not taking: Reported on 4/15/2022)    cephALEXin (KEFLEX) 500 mg capsule Take 1 Cap by mouth three (3) times daily. (Patient not taking: Reported on 4/15/2022)    MULTIVITAMIN PO Take  by mouth. (Patient not taking: Reported on 4/15/2022)     No current facility-administered medications for this visit. Social History     Socioeconomic History    Marital status:      Spouse name: Not on file    Number of children: Not on file    Years of education: Not on file    Highest education level: Not on file   Occupational History    Not on file   Tobacco Use    Smoking status: Never Smoker    Smokeless tobacco: Never Used   Substance and Sexual Activity    Alcohol use: No    Drug use: No    Sexual activity: Yes   Other Topics Concern    Not on file   Social History Narrative    Not on file     Social Determinants of Health     Financial Resource Strain:     Difficulty of Paying Living Expenses: Not on file   Food Insecurity:     Worried About Running Out of Food in the Last Year: Not on file    Niles of Food in the Last Year: Not on file   Transportation Needs:     Lack of Transportation (Medical): Not on file    Lack of Transportation (Non-Medical):  Not on file   Physical Activity:     Days of Exercise per Week: Not on file    Minutes of Exercise per Session: Not on file   Stress:     Feeling of Stress : Not on file   Social Connections:     Frequency of Communication with Friends and Family: Not on file    Frequency of Social Gatherings with Friends and Family: Not on file    Attends Quaker Services: Not on file    Active Member of Clubs or Organizations: Not on file    Attends Club or Organization Meetings: Not on file    Marital Status: Not on file   Intimate Partner Violence:     Fear of Current or Ex-Partner: Not on file    Emotionally Abused: Not on file    Physically Abused: Not on file    Sexually Abused: Not on file Housing Stability:     Unable to Pay for Housing in the Last Year: Not on file    Number of Places Lived in the Last Year: Not on file    Unstable Housing in the Last Year: Not on file       Past Surgical History:   Procedure Laterality Date    HX KNEE ARTHROSCOPY         Family History   Problem Relation Age of Onset    Cancer Mother         lung    Cancer Father         Prostate            Review of Systems    No flowsheet data found. Vitals:  Ht 5' 10\" (1.778 m)   Wt 225 lb (102.1 kg)   BMI 32.28 kg/m²    Estimated body surface area is 2.25 meters squared as calculated from the following:    Height as of this encounter: 5' 10\" (1.778 m). Weight as of this encounter: 225 lb (102.1 kg). Body mass index is 32.28 kg/m². Physical Exam    Musculoskeletal Exam:    Bilateral NEURO EXAM:    Phalen's: Negative    MNCT: Negative    Thenar Atrophy: none    Tinel's MN: Negative    APB STRENGTH: 5/5    1st DI Strength: 3/5      Elbow Flexion Test: Positive    UNCT Elbow: Positive    UNCT Palm: Negative    Ring/Small Clawing: Positive    Froment Sign: Positive    Tinel's UN: Positive    Patient has severe interosseous wasting bilaterally. He has tenderness over the left ring finger A1 pulley and locking on examination. Patient fires AIN, PIN and ulnar nerves. Sensation is grossly intact in the median, radial and severely diminished in the ulnar distribution. Hand is pink and appears well-perfused. Hand is warm. Skin is intact. Compartments are soft and compressible.         Consitutional: Healthy  Skin:   - Edema - none  - Cellulitis - No    Neuro: Numbness or tingling in R/L arm: Yes    Psych: Affect normal    Cardiovascular: Capillary Refill < 2 seconds in upper extremities    Respiratory: Non-Labored Breathing    ROS:    Constitutional: Denies fever/chills    Respiratory: Denies SOB              Orders Placed This Encounter    EMG TWO EXTREMITIES UPPER     Standing Status:   Future Standing Expiration Date:   10/15/2022     Order Specific Question:   Reason for Exam:     Answer:   cubital tunnel syndrome bilateral          An electronic signature was used to authenticate this note.   -- Deb Edwards MD

## 2022-04-15 NOTE — PATIENT INSTRUCTIONS
From the [id number]    Cubital Tunnel Syndrome    Cubital Tunnel Syndrome is a condition that involves pressure or stretching of the ulnar nerve (also known as the funny bone nerve), which can cause numbness or tingling in the ring and small fingers, pain in the forearm, and/or weakness in the hand. The ulnar nerve (Figure 1) runs in a groove on the inner side of the elbow. Figure 1  Ulnar nerve at elbow joint (inner side of elbow), which is involved in cubital tunnel syndrome        Causes    There are a few causes of this ulnar nerve problem. These include:    Pressure: The nerve has little padding over it. Direct pressure (like leaning the arm on an arm rest) can press the nerve, causing the arm and hand -- especially the ring and small fingers -- to fall asleep.     Stretching: Keeping the elbow bent for a long time can stretch the nerve behind the elbow. This can happen during sleep. Anatomy: Sometimes, the ulnar nerve does not stay in its place and snaps back and forth over a bony bump as the elbow is moved. Repeated snapping can irritate the nerve. Sometimes, the soft tissues over the nerve become thicker or there is an extra muscle over the nerve that can keep it from working correctly. Signs and Symptoms    Cubital tunnel syndrome can cause pain, loss of sensation, tingling and/or weakness. Pins and needles usually are felt in the ring and small fingers. These symptoms are often felt when the elbow is bent for a long period of time, such as while holding a phone or while sleeping. Some people feel weak or clumsy. Diagnosis    Your doctor can learn much by asking you about your symptoms and examining you. S/he might test you for other medical problems like diabetes or thyroid disease. Sometimes, nerve testing (EMG/NCS) may be needed to see how much the nerve and muscle are being affected.  This test also checks for other problems such as a pinched nerve in the neck, which can cause similar symptoms. Treatment    The first treatment is to avoid actions that cause symptoms. Wrapping a pillow or towel loosely around the elbow or wearing a splint at night to keep the elbow from bending can help. Avoiding leaning on the funny bone can also help. A hand therapist can help you find ways to avoid pressure on the nerve. Sometimes, surgery may be needed to relieve the pressure on the nerve. This can involve releasing the nerve, moving the nerve to the front of the elbow, and/or removing a part of the bone. Your surgeon will talk to you about options. Therapy is sometimes needed after surgery, and the time it takes to recover can vary. Numbness and tingling may improve quickly or slowly. It may take many months for recovery after surgery. Cubital tunnel symptoms may not totally go away after surgery, especially if symptoms are severe. From the [employer]    Trigger Finger    What is Trigger Finger? A trigger finger is a very common and treatable problem. It can occur in both fingers and the thumbs, which have tendons that help them to bend. The flexor tendons that bend the fingers have a lining on the outside. This lining is called tenosynovium. The tendon and lining are covered by a series of thick, soft tissue called pulleys. The tendon and its lining are designed to glide through the pulleys without friction. The pulleys are similar to how a line is held on a fishing marc (Figure 1). A trigger finger, sometimes referred to as a trigger thumb or stenosing tenosynovitis, can occur if one of three things happen: 1. The tendon enlarges (does not fit through pulley well); 2. The lining increases in thickness (does not fit through pulley well); 3. the pulley becomes thicker (the opening for the tendon gets smaller). The finger tendon and pulley system is designed to have the exact right sizes of each structure.  The change in size of any of the important finger structures can cause problems. If the tendon becomes tight within the pulley, the lining gets squeezed and reacts with thickening. The bigger lining then produces more fluid. And the higher volume of fluid increases pressure. The undersurface of the pulley can also change and thicken. This thicker pulley causes friction on the moving tendon. This makes it difficult for the tendon to move back and forth (Figure 2). The good news is that trigger finger can be diagnosed by the history, symptoms, and a physical exam. It is rare to require other diagnostic testing. It is also helpful to know this problem has several very successful treatments. Figure 1        The pulley and tendon in a finger, gliding normally. Figure 2        As in trigger finger, if the pulley becomes too thick, the tendon cannot glide through it. Causes  Trigger fingers are more common with certain medical conditions. Rheumatoid arthritis, gout and diabetes are risk factors for this condition. Repeated and strong gripping may lead to the condition. In most cases, the cause of the trigger finger is not known. Signs and Symptoms    Some symptoms of trigger finger can include:    Pain: Trigger finger may start with discomfort felt at the base of the affected finger or thumb, where the finger joins the palm. This may be the only initial symptom. This pain occurs with pressure over the A1 pulley area. The pain is often only present with activity such as gripping. When at rest, it may not hurt. Over time, if there is increased fluid production in the tendon sheath, this may cause pressure and pain even without hand use. Swelling: Over time there may be the development of a lump at the A1 pulley. This can be due to a nodular swelling within the tendon or the development of a fluid filled cyst. The cyst is called a flexor sheath ganglion.     Stiffness or loss of motion: A trigger finger may result in loss of the ability to bend the finger. This can be estimated by how far the tip of the finger is from the palm of the hand when the patient is asked to bend the finger as much as they can. This is most common in chronic, untreated trigger fingers. It can be painful to try and bend the finger due to the compression of the fluid. Over time, the person may start to avoid a bent position of the finger to limit pain. Trigger fingers can also result in loss of the ability to straighten the finger. Some patients will feel pain trying to fully straighten. When the joint does not fully straighten for several weeks, a ligament called the volar plate becomes shortened and limits motion. Mechanical symptoms: A trigger finger can cause abnormal sensations or movement that are often described as popping, catching, or locking. Sometimes these abnormal sensations occur while bending or straightening the finger, or both. Early on, the symptoms may be mildly painful, but as the tendon and pulley interaction becomes tighter, the pain can increase. Treatment    Non-Surgical    The goal of treatment in a trigger finger is to reduce or eliminate the swelling and catching/locking, allowing full, painless movement of the finger or thumb. The ability to restore the finger to what the patient believes is normal or 100% is easier when the problem is diagnosed and treated as soon as possible. Common treatment options include, but are not limited to:    Splinting at night. It is estimated that much of the bodys fluid volume pools in the legs during the day when we sit and stand due to the effects of gravity. When someone lays flat at night, the effect of gravity on the legs is more similar to the arms, so fluid may shift from the legs to the arms. This may increase swelling in the fingers where pain and locking can be more frequent at night and the early morning.  By using a night splint to keep the finger straight, it can prevent painful locking during sleep. However, keeping the finger straight all night could result in the need to spend some time and effort getting it to move smoothly the next morning. Nonsteroidal anti-inflammatory drugs (NSAIDs). Many times, oral or topical anti-inflammatory medication (like ibuprofen or naproxen) can be tried to relieve pain and improve ability to move the finger through a large arc. Changing your activity. It may be possible to limit or space out the amount of time spent in forceful, repetitive, or sustained gripping. Steroid injection. Corticosteroid injections, also known as a cortisone shot, can be given at any stage of symptoms or duration. However, there may be better success when they are given early. Hand therapy. Patients may benefit from some supervised and home exercises. It can be helpful to have a hand therapist teach concepts and techniques such as passive joint motion, tendon differential tendon gliding, proximal joint blocking to isolate more distal joints, edema control, and other treatments. Surgical    If non-surgical treatments do not relieve the symptoms, surgery may be recommended. The goal of surgery is to open the pulley at the base of the finger so that the tendon can glide more freely. The clicking or popping goes away in most cases after cutting the A1 pulley. If there are still mechanical symptoms after a trigger finger release, a flexor tenosynovectomy can be considered. This procedure removes the thickened lining from the surface of the tendons. If there are still mechanical symptoms, then part of the superficial tendon can be removed to reduce the volume of tendon moving in and out of the rest of the pulley system. It is optimal if all the above surgical treatments can be performed during the same procedure.     With surgical treatment, the chances of recognizing and treating all changes to the finger is improved when it is possible for the patient to be awake at the end of the procedure to follow instructions. By having the patient able to actively bend and straighten their fingers several times, the surgeon can verify the mechanical symptoms are absent. Finger motion can return at different speeds depending on each patient and their unique timing of symptom development, when treatments begin, and the effectiveness of each type of treatment. Your orthopaedic hand surgeon will develop an individual treatment plan for you. There are different ways to perform the surgery. There are several different surgical techniques, anesthesia options, and locations where the procedure can occur. There can be some ongoing stiffness after hand surgery even if there is no more locking, and it may remain long-term. Therefore, hand therapy can be beneficial after surgery whether or not it was used before surgery. There may be some mild to moderate tenderness at the surgery area for up to several months after surgery. However, most patients resume their normal lifestyles within a few weeks. From the [id number] [facility]    Cubital Tunnel Syndrome    Cubital Tunnel Syndrome is a condition that involves pressure or stretching of the ulnar nerve (also known as the funny bone nerve), which can cause numbness or tingling in the ring and small fingers, pain in the forearm, and/or weakness in the hand. The ulnar nerve (Figure 1) runs in a groove on the inner side of the elbow. Figure 1  Ulnar nerve at elbow joint (inner side of elbow), which is involved in cubital tunnel syndrome        Causes    There are a few causes of this ulnar nerve problem. These include:    Pressure: The nerve has little padding over it. Direct pressure (like leaning the arm on an arm rest) can press the nerve, causing the arm and hand -- especially the ring and small fingers -- to fall asleep.     Stretching: Keeping the elbow bent for a long time can stretch the nerve behind the elbow.  This can happen during sleep. Anatomy: Sometimes, the ulnar nerve does not stay in its place and snaps back and forth over a bony bump as the elbow is moved. Repeated snapping can irritate the nerve. Sometimes, the soft tissues over the nerve become thicker or there is an extra muscle over the nerve that can keep it from working correctly. Signs and Symptoms    Cubital tunnel syndrome can cause pain, loss of sensation, tingling and/or weakness. Pins and needles usually are felt in the ring and small fingers. These symptoms are often felt when the elbow is bent for a long period of time, such as while holding a phone or while sleeping. Some people feel weak or clumsy. Diagnosis    Your doctor can learn much by asking you about your symptoms and examining you. S/he might test you for other medical problems like diabetes or thyroid disease. Sometimes, nerve testing (EMG/NCS) may be needed to see how much the nerve and muscle are being affected. This test also checks for other problems such as a pinched nerve in the neck, which can cause similar symptoms. Treatment    The first treatment is to avoid actions that cause symptoms. Wrapping a pillow or towel loosely around the elbow or wearing a splint at night to keep the elbow from bending can help. Avoiding leaning on the funny bone can also help. A hand therapist can help you find ways to avoid pressure on the nerve. Sometimes, surgery may be needed to relieve the pressure on the nerve. This can involve releasing the nerve, moving the nerve to the front of the elbow, and/or removing a part of the bone. Your surgeon will talk to you about options. Therapy is sometimes needed after surgery, and the time it takes to recover can vary. Numbness and tingling may improve quickly or slowly. It may take many months for recovery after surgery. Cubital tunnel symptoms may not totally go away after surgery, especially if symptoms are severe.       From the [employer]    Trigger Finger    What is Trigger Finger? A trigger finger is a very common and treatable problem. It can occur in both fingers and the thumbs, which have tendons that help them to bend. The flexor tendons that bend the fingers have a lining on the outside. This lining is called tenosynovium. The tendon and lining are covered by a series of thick, soft tissue called pulleys. The tendon and its lining are designed to glide through the pulleys without friction. The pulleys are similar to how a line is held on a fishing marc (Figure 1). A trigger finger, sometimes referred to as a trigger thumb or stenosing tenosynovitis, can occur if one of three things happen: 1. The tendon enlarges (does not fit through pulley well); 2. The lining increases in thickness (does not fit through pulley well); 3. the pulley becomes thicker (the opening for the tendon gets smaller). The finger tendon and pulley system is designed to have the exact right sizes of each structure. The change in size of any of the important finger structures can cause problems. If the tendon becomes tight within the pulley, the lining gets squeezed and reacts with thickening. The bigger lining then produces more fluid. And the higher volume of fluid increases pressure. The undersurface of the pulley can also change and thicken. This thicker pulley causes friction on the moving tendon. This makes it difficult for the tendon to move back and forth (Figure 2). The good news is that trigger finger can be diagnosed by the history, symptoms, and a physical exam. It is rare to require other diagnostic testing. It is also helpful to know this problem has several very successful treatments. Figure 1        The pulley and tendon in a finger, gliding normally. Figure 2        As in trigger finger, if the pulley becomes too thick, the tendon cannot glide through it.   Causes  Trigger fingers are more common with certain medical conditions. Rheumatoid arthritis, gout and diabetes are risk factors for this condition. Repeated and strong gripping may lead to the condition. In most cases, the cause of the trigger finger is not known. Signs and Symptoms    Some symptoms of trigger finger can include:    Pain: Trigger finger may start with discomfort felt at the base of the affected finger or thumb, where the finger joins the palm. This may be the only initial symptom. This pain occurs with pressure over the A1 pulley area. The pain is often only present with activity such as gripping. When at rest, it may not hurt. Over time, if there is increased fluid production in the tendon sheath, this may cause pressure and pain even without hand use. Swelling: Over time there may be the development of a lump at the A1 pulley. This can be due to a nodular swelling within the tendon or the development of a fluid filled cyst. The cyst is called a flexor sheath ganglion. Stiffness or loss of motion: A trigger finger may result in loss of the ability to bend the finger. This can be estimated by how far the tip of the finger is from the palm of the hand when the patient is asked to bend the finger as much as they can. This is most common in chronic, untreated trigger fingers. It can be painful to try and bend the finger due to the compression of the fluid. Over time, the person may start to avoid a bent position of the finger to limit pain. Trigger fingers can also result in loss of the ability to straighten the finger. Some patients will feel pain trying to fully straighten. When the joint does not fully straighten for several weeks, a ligament called the volar plate becomes shortened and limits motion. Mechanical symptoms: A trigger finger can cause abnormal sensations or movement that are often described as popping, catching, or locking. Sometimes these abnormal sensations occur while bending or straightening the finger, or both.  Early on, the symptoms may be mildly painful, but as the tendon and pulley interaction becomes tighter, the pain can increase. Treatment    Non-Surgical    The goal of treatment in a trigger finger is to reduce or eliminate the swelling and catching/locking, allowing full, painless movement of the finger or thumb. The ability to restore the finger to what the patient believes is normal or 100% is easier when the problem is diagnosed and treated as soon as possible. Common treatment options include, but are not limited to:    Splinting at night. It is estimated that much of the bodys fluid volume pools in the legs during the day when we sit and stand due to the effects of gravity. When someone lays flat at night, the effect of gravity on the legs is more similar to the arms, so fluid may shift from the legs to the arms. This may increase swelling in the fingers where pain and locking can be more frequent at night and the early morning. By using a night splint to keep the finger straight, it can prevent painful locking during sleep. However, keeping the finger straight all night could result in the need to spend some time and effort getting it to move smoothly the next morning. Nonsteroidal anti-inflammatory drugs (NSAIDs). Many times, oral or topical anti-inflammatory medication (like ibuprofen or naproxen) can be tried to relieve pain and improve ability to move the finger through a large arc. Changing your activity. It may be possible to limit or space out the amount of time spent in forceful, repetitive, or sustained gripping. Steroid injection. Corticosteroid injections, also known as a cortisone shot, can be given at any stage of symptoms or duration. However, there may be better success when they are given early. Hand therapy. Patients may benefit from some supervised and home exercises.  It can be helpful to have a hand therapist teach concepts and techniques such as passive joint motion, tendon differential tendon gliding, proximal joint blocking to isolate more distal joints, edema control, and other treatments. Surgical    If non-surgical treatments do not relieve the symptoms, surgery may be recommended. The goal of surgery is to open the pulley at the base of the finger so that the tendon can glide more freely. The clicking or popping goes away in most cases after cutting the A1 pulley. If there are still mechanical symptoms after a trigger finger release, a flexor tenosynovectomy can be considered. This procedure removes the thickened lining from the surface of the tendons. If there are still mechanical symptoms, then part of the superficial tendon can be removed to reduce the volume of tendon moving in and out of the rest of the pulley system. It is optimal if all the above surgical treatments can be performed during the same procedure. With surgical treatment, the chances of recognizing and treating all changes to the finger is improved when it is possible for the patient to be awake at the end of the procedure to follow instructions. By having the patient able to actively bend and straighten their fingers several times, the surgeon can verify the mechanical symptoms are absent. Finger motion can return at different speeds depending on each patient and their unique timing of symptom development, when treatments begin, and the effectiveness of each type of treatment. Your orthopaedic hand surgeon will develop an individual treatment plan for you. There are different ways to perform the surgery. There are several different surgical techniques, anesthesia options, and locations where the procedure can occur. There can be some ongoing stiffness after hand surgery even if there is no more locking, and it may remain long-term. Therefore, hand therapy can be beneficial after surgery whether or not it was used before surgery.  There may be some mild to moderate tenderness at the surgery area for up to several months after surgery. However, most patients resume their normal lifestyles within a few weeks.

## 2022-05-06 ENCOUNTER — OFFICE VISIT (OUTPATIENT)
Dept: ORTHOPEDIC SURGERY | Age: 57
End: 2022-05-06
Payer: OTHER MISCELLANEOUS

## 2022-05-06 VITALS — BODY MASS INDEX: 32.21 KG/M2 | HEIGHT: 70 IN | WEIGHT: 225 LBS

## 2022-05-06 DIAGNOSIS — G56.02 CARPAL TUNNEL SYNDROME ON LEFT: ICD-10-CM

## 2022-05-06 DIAGNOSIS — M65.342 TRIGGER FINGER, LEFT RING FINGER: Primary | ICD-10-CM

## 2022-05-06 DIAGNOSIS — G56.01 RIGHT CARPAL TUNNEL SYNDROME: ICD-10-CM

## 2022-05-06 DIAGNOSIS — G56.23 CUBITAL TUNNEL SYNDROME OF BOTH UPPER EXTREMITIES: ICD-10-CM

## 2022-05-06 PROCEDURE — 99213 OFFICE O/P EST LOW 20 MIN: CPT | Performed by: ORTHOPAEDIC SURGERY

## 2022-05-06 NOTE — PROGRESS NOTES
Noris Valente (: 1965) is a 64 y.o. male patient here for evaluation of the following chief complaint(s):  Hand Pain       ASSESSMENT/PLAN:  Below is the assessment and plan developed based on review of pertinent history, physical exam, labs, studies, and medications. 1. Trigger finger, left ring finger  2. Cubital tunnel syndrome of both upper extremities  3. Carpal tunnel syndrome on left  4. Right carpal tunnel syndrome      I discussed treatment options with the patient and he does have Shore grade 3 changes on his exam.      We reviewed the EMG results. Given that he has moderate changes to the median nerve at the carpal tunnel we discussed carpal tunnel release as well. Currently his symptoms are not prominent in the carpal tunnel however we discussed moderate sensory changes and borderline motor changes and that he very well may need surgery in the future for that. We also discussed treating it at this time given the EMG changes and he did elect to proceed with that. He understands he could wait for that. For the ring finger we also discussed steroid injection versus surgery on the left side but since he is ready for surgery for the other pathology we will plan for trigger release as well. We discussed risks, benefits and alternatives to surgery. After thorough discussion ultimately the patient elected to proceed with operative intervention. We discussed the risks including but not limited to postoperative pain, swelling, bruising, bleeding, scarring, infection, damage to neurovascular structures. We also discussed permanent or temporary loss of range of motion, need for additional surgery, rejection of foreign material such as metal, suture or other tissue. We discussed risk of blood clots. Plan for left cubital tunnel release, left carpal tunnel release, left ring finger trigger release.     I will see the patient back postoperatively and 1 month later we will plan for right sided surgery for carpal and cubital tunnel. Patient verbalized understanding and elected to proceed. All questions were answered to the patient's apparent satisfaction. SUBJECTIVE/OBJECTIVE:  HPI    51-year-old male following up on bilateral upper extremity EMG. He does notice the entire hands go numb at night at times. However throughout the day the most prominent symptoms are the ulnar nerve distribution being numb. As well as significant weakness and stiffness in the hands. Patient reports a sudden onset of symptoms. Duration of problem 4 months, began 1/10/2022. Symptom Severity 7/10  Symptom Frequency constant        No Known Allergies    Current Outpatient Medications   Medication Sig    omega 3-dha-epa-fish oil (FISH OIL) 100-160-1,000 mg cap Take  by mouth. (Patient not taking: Reported on 4/15/2022)    cephALEXin (KEFLEX) 500 mg capsule Take 1 Cap by mouth three (3) times daily. (Patient not taking: Reported on 4/15/2022)    MULTIVITAMIN PO Take  by mouth. (Patient not taking: Reported on 4/15/2022)     No current facility-administered medications for this visit. Social History     Socioeconomic History    Marital status:      Spouse name: Not on file    Number of children: Not on file    Years of education: Not on file    Highest education level: Not on file   Occupational History    Not on file   Tobacco Use    Smoking status: Never Smoker    Smokeless tobacco: Never Used   Substance and Sexual Activity    Alcohol use: No    Drug use: No    Sexual activity: Yes   Other Topics Concern    Not on file   Social History Narrative    Not on file     Social Determinants of Health     Financial Resource Strain:     Difficulty of Paying Living Expenses: Not on file   Food Insecurity:     Worried About Running Out of Food in the Last Year: Not on file    Niles of Food in the Last Year: Not on file   Transportation Needs:     Lack of Transportation (Medical):  Not on file    Lack of Transportation (Non-Medical): Not on file   Physical Activity:     Days of Exercise per Week: Not on file    Minutes of Exercise per Session: Not on file   Stress:     Feeling of Stress : Not on file   Social Connections:     Frequency of Communication with Friends and Family: Not on file    Frequency of Social Gatherings with Friends and Family: Not on file    Attends [employer] Services: Not on file    Active Member of [address] [place] [address] or Organizations: Not on file    Attends Club or Organization Meetings: Not on file    Marital Status: Not on file   Intimate Partner Violence:     Fear of Current or Ex-Partner: Not on file    Emotionally Abused: Not on file    Physically Abused: Not on file    Sexually Abused: Not on file   Housing Stability:     Unable to Pay for Housing in the Last Year: Not on file    Number of [place] in the Last Year: Not on file    Unstable Housing in the Last Year: Not on file       Past Surgical History:   Procedure Laterality Date    HX KNEE ARTHROSCOPY         Family History   Problem Relation Age of Onset    Cancer Mother         lung    Cancer Father         Prostate            Review of Systems  ROS     Positive for: Musculoskeletal    Last edited by Pia Rao on 5/6/2022 10:26 AM. (History)        No flowsheet data found. Vitals:  Ht 5' 10\" (1.778 m)   Wt 225 lb (102.1 kg)   BMI 32.28 kg/m²    Estimated body surface area is 2.25 meters squared as calculated from the following:    Height as of this encounter: 5' 10\" (1.778 m). Weight as of this encounter: 225 lb (102.1 kg). Body mass index is 32.28 kg/m².        Physical Exam    Musculoskeletal Exam:    Bilateral NEURO EXAM:    Phalen's: Negative    MNCT: Negative    Thenar Atrophy: none    Tinel's MN: Negative    APB STRENGTH: 5/5    1st DI Strength: 3/5      Elbow Flexion Test: Positive    UNCT Elbow: Positive    UNCT Palm: Negative    Ring/Small Clawing: Positive    Froment Sign: Positive    Tinel's UN: Positive    Patient has severe interosseous wasting bilaterally. He has tenderness over the left ring finger A1 pulley and locking on examination. Unobtainable two-point discrimination of the ulnar nerve distribution. He does have 5 mm two-point discrimination in the other digits. 10 mm on the ulnar aspect of the ring finger. Patient fires AIN, PIN and ulnar nerves. Sensation is grossly intact in the median, radial and severely diminished in the ulnar distribution. Hand is pink and appears well-perfused. Hand is warm. Skin is intact. Compartments are soft and compressible. Consitutional: Healthy  Skin:   - Edema - none  - Cellulitis - No    Neuro: Numbness or tingling in R/L arm: Yes    Psych: Affect normal    Cardiovascular: Capillary Refill < 2 seconds in upper extremities    Respiratory: Non-Labored Breathing    ROS:    Constitutional: Denies fever/chills    Respiratory: Denies SOB      EMG:     Electrodiagnostic impression:     Electrodiagnostic evidence for severe bilateral ulnar neuropathies at or just distal to the ulnar grooves with signs of significant axonal degeneration of the motor fibers particularly distally. Electrodiagnostic evidence for bilateral median neuropathies at the wrists with moderate sensory involvement and borderline motor involvement. No electrodiagnostic evidence for polyneuropathy or radiculopathy involving bilateral upper extremity motor axons. An electronic signature was used to authenticate this note.   -- Eloina Love MD

## 2022-05-24 ENCOUNTER — TELEPHONE (OUTPATIENT)
Dept: ORTHOPEDIC SURGERY | Age: 57
End: 2022-05-24

## 2022-05-24 NOTE — TELEPHONE ENCOUNTER
[employer] paperwork sent to [facility] HR R#[phone].

## 2022-06-01 DIAGNOSIS — G56.02 CARPAL TUNNEL SYNDROME ON LEFT: Primary | ICD-10-CM

## 2022-06-01 RX ORDER — HYDROCODONE BITARTRATE AND ACETAMINOPHEN 5; 325 MG/1; MG/1
1 TABLET ORAL
Qty: 10 TABLET | Refills: 0 | Status: SHIPPED | OUTPATIENT
Start: 2022-06-01 | End: 2022-06-06

## 2022-06-13 ENCOUNTER — OFFICE VISIT (OUTPATIENT)
Dept: ORTHOPEDIC SURGERY | Age: 57
End: 2022-06-13
Payer: OTHER MISCELLANEOUS

## 2022-06-13 VITALS — WEIGHT: 225 LBS | HEIGHT: 70 IN | BODY MASS INDEX: 32.21 KG/M2

## 2022-06-13 DIAGNOSIS — G56.02 CARPAL TUNNEL SYNDROME ON LEFT: ICD-10-CM

## 2022-06-13 DIAGNOSIS — G56.23 CUBITAL TUNNEL SYNDROME OF BOTH UPPER EXTREMITIES: Primary | ICD-10-CM

## 2022-06-13 DIAGNOSIS — G56.01 RIGHT CARPAL TUNNEL SYNDROME: ICD-10-CM

## 2022-06-13 PROCEDURE — 99024 POSTOP FOLLOW-UP VISIT: CPT | Performed by: ORTHOPAEDIC SURGERY

## 2022-06-13 NOTE — PROGRESS NOTES
Gretta Zhou (: 1965) is a 64 y.o. male patient here for evaluation of the following chief complaint(s):  Elbow Pain (po left elbow)       ASSESSMENT/PLAN:  Below is the assessment and plan developed based on review of pertinent history, physical exam, labs, studies, and medications. 1. Cubital tunnel syndrome of both upper extremities  2. Right carpal tunnel syndrome  3. Carpal tunnel syndrome on left      Patient is doing well after left carpal tunnel and cubital tunnel release. He has no worsening of his symptoms but has not noticed significant improvement at this time. He no longer has the triggering which has improved. On the right side he had the same findings on the clinical and EMG exam.  He wants to proceed with surgery on that side as well. He understands it may not improve his symptoms but hopefully at the very least would be preventative from getting significantly worse but again he understands just like this past surgery risks associated and the fact that significant damage has already occurred. We discussed risks, benefits and alternatives to surgery. After thorough discussion ultimately the patient elected to proceed with operative intervention. We discussed the risks including but not limited to postoperative pain, swelling, bruising, bleeding, scarring, infection, damage to neurovascular structures. We also discussed permanent or temporary loss of range of motion, need for additional surgery, rejection of foreign material such as metal, suture or other tissue. We discussed risk of blood clots. Plan is for right carpal tunnel release and right cubital tunnel release. Patient verbalized understanding and elected to proceed. All questions were answered to the patient's apparent satisfaction. Since we are planning another surgery patient will be out of work until at least 2022. SUBJECTIVE/OBJECTIVE:    Patient is here today for a postoperative visit.      Date of Surgery: 6/1/22    Patient overall is doing well he reports no significant pain. The ring finger is no longer catching or locking. Overall making some improvement. No Known Allergies    Current Outpatient Medications   Medication Sig    omega 3-dha-epa-fish oil (FISH OIL) 100-160-1,000 mg cap Take  by mouth. (Patient not taking: Reported on 4/15/2022)    cephALEXin (KEFLEX) 500 mg capsule Take 1 Cap by mouth three (3) times daily. (Patient not taking: Reported on 4/15/2022)    MULTIVITAMIN PO Take  by mouth. (Patient not taking: Reported on 4/15/2022)     No current facility-administered medications for this visit. Social History     Socioeconomic History    Marital status:      Spouse name: Not on file    Number of children: Not on file    Years of education: Not on file    Highest education level: Not on file   Occupational History    Not on file   Tobacco Use    Smoking status: Never Smoker    Smokeless tobacco: Never Used   Substance and Sexual Activity    Alcohol use: No    Drug use: No    Sexual activity: Yes   Other Topics Concern    Not on file   Social History Narrative    Not on file     Social Determinants of Health     Financial Resource Strain:     Difficulty of Paying Living Expenses: Not on file   Food Insecurity:     Worried About Running Out of Food in the Last Year: Not on file    [place]iles of Food in the Last Year: Not on file   Transportation Needs:     Lack of Transportation (Medical): Not on file    Lack of Transportation (Non-Medical):  Not on file   Physical Activity:     Days of Exercise per Week: Not on file    Minutes of Exercise per Session: Not on file   Stress:     Feeling of Stress : Not on file   Social Connections:     Frequency of Communication with Friends and Family: Not on file    Frequency of Social Gatherings with Friends and Family: Not on file    Attends Pent[employer]ostal Services: Not on file   [employer] of [employer] or Organizations: Not on file    Attends Club or Organization Meetings: Not on file    Marital Status: Not on file   Intimate Partner Violence:     Fear of Current or Ex-Partner: Not on file    Emotionally Abused: Not on file    Physically Abused: Not on file    Sexually Abused: Not on file   Housing Stability:     Unable to Pay for Housing in the Last Year: Not on file    Number of [place] in the Last Year: Not on file    Unstable Housing in the Last Year: Not on file       Past Surgical History:   Procedure Laterality Date    HX KNEE ARTHROSCOPY         Family History   Problem Relation Age of Onset    Cancer Mother         lung    Cancer Father         Prostate            Review of Systems    No flowsheet data found. Vitals:  Ht 5' 10\" (1.778 m)   Wt 225 lb (102.1 kg)   BMI 32.28 kg/m²    Estimated body surface area is 2.25 meters squared as calculated from the following:    Height as of this encounter: 5' 10\" (1.778 m). Weight as of this encounter: 225 lb (102.1 kg). Body mass index is 32.28 kg/m². Physical Exam    Musculoskeletal Exam:    Left Upper Extremity Exam:    Evaluation of the patient's postoperative site shows that the incision is intact and healing appropriately. There are no signs of infection, no redness, no warmth, no erythema. There is no purulent drainage noted. Patient fires AIN, PIN and ulnar nerves. Sensation is grossly intact in the median, radial and ulnar distribution. Hand is pink and appears well-perfused. Hand is warm. Right side exam is unchanged from prior appointments.       Consitutional: Healthy  Skin:   - Edema - mild  - Cellulitis - No    Neuro: Numbness or tingling in R/L arm: Unchanged    Psych: Affect normal    Cardiovascular: Capillary Refill < 2 seconds in upper extremities    Respiratory: Non-Labored Breathing      ROS:    Constitutional: Denies fever/chills    Respiratory: Denies SOB          Procedures:    Sutures removed without difficulty today. An electronic signature was used to authenticate this note.   -- Lizz Harrell MD

## 2022-06-13 NOTE — LETTER
NOTIFICATION RETURN TO WORK / SCHOOL    6/13/2022 10:48 AM    Mr. Jeanette Quinteros  [facility] [address]      To Whom It May Concern:    Jeanette Quinteros is currently under the care of [facility]. At this time the patient remains out of work after his surgery on June 1, 2022. We also now planning surgery for the right upper extremity. This will be likely towards the end of June. For this surgery he will remain out of work through at least August 1, 2022. However he will be reevaluated prior to that time to determine exactly when he will return. If there are questions or concerns please have the patient contact our office.         Sincerely,      Maksim Stevens MD

## 2022-06-14 DIAGNOSIS — G56.01 RIGHT CARPAL TUNNEL SYNDROME: Primary | ICD-10-CM

## 2022-06-14 DIAGNOSIS — G56.23 CUBITAL TUNNEL SYNDROME OF BOTH UPPER EXTREMITIES: ICD-10-CM

## 2022-06-24 ENCOUNTER — TELEPHONE (OUTPATIENT)
Dept: ORTHOPEDIC SURGERY | Age: 57
End: 2022-06-24

## 2022-06-24 NOTE — TELEPHONE ENCOUNTER
Pt called inquiring about status of surgery authorization. Advised that last note states left message for Caroline. Pt states new  is Dejon Huynh X#[phone] G#[phone]. This information was sent to [facility]. Advised will call pt with any update to auth.

## 2022-06-29 DIAGNOSIS — G56.23 CUBITAL TUNNEL SYNDROME OF BOTH UPPER EXTREMITIES: Primary | ICD-10-CM

## 2022-06-29 RX ORDER — HYDROCODONE BITARTRATE AND ACETAMINOPHEN 5; 325 MG/1; MG/1
1 TABLET ORAL
Qty: 20 TABLET | Refills: 0 | Status: SHIPPED | OUTPATIENT
Start: 2022-06-29 | End: 2022-07-06

## 2022-07-22 ENCOUNTER — OFFICE VISIT (OUTPATIENT)
Dept: ORTHOPEDIC SURGERY | Age: 57
End: 2022-07-22
Payer: OTHER MISCELLANEOUS

## 2022-07-22 VITALS — WEIGHT: 225 LBS | BODY MASS INDEX: 32.21 KG/M2 | HEIGHT: 70 IN

## 2022-07-22 DIAGNOSIS — G56.02 CARPAL TUNNEL SYNDROME ON LEFT: ICD-10-CM

## 2022-07-22 DIAGNOSIS — G56.23 CUBITAL TUNNEL SYNDROME OF BOTH UPPER EXTREMITIES: Primary | ICD-10-CM

## 2022-07-22 DIAGNOSIS — G56.01 RIGHT CARPAL TUNNEL SYNDROME: ICD-10-CM

## 2022-07-22 PROCEDURE — 99024 POSTOP FOLLOW-UP VISIT: CPT | Performed by: ORTHOPAEDIC SURGERY

## 2022-07-22 RX ORDER — BISACODYL 5 MG/1
TABLET, COATED ORAL
COMMUNITY

## 2022-07-22 NOTE — PROGRESS NOTES
Liu Vick (: 1965) is a 62 y.o. male patient here for evaluation of the following chief complaint(s):  Hand Pain (Right hand) and Elbow Pain (Right elbow)       ASSESSMENT/PLAN:  Below is the assessment and plan developed based on review of pertinent history, physical exam, labs, studies, and medications. 1. Cubital tunnel syndrome of both upper extremities  -     REFERRAL TO PHYSICAL THERAPY  2. Carpal tunnel syndrome on left  -     REFERRAL TO PHYSICAL THERAPY  3. Right carpal tunnel syndrome  -     REFERRAL TO PHYSICAL THERAPY      Patient is doing well after bilateral carpal tunnel and cubital tunnel release. He has no worsening of his symptoms but has not noticed significant improvement at this time. Patient reports he is ready for physical therapy and I think to improve his range of motion of the elbows that is reasonable. For now we will keep him out of work, return on 2022 to improve strength and range of motion. At that time he will be able to get back to at least some light duty if not full duty. Patient verbalized understanding and elected to proceed. All questions were answered to the patient's apparent satisfaction. SUBJECTIVE/OBJECTIVE:    Patient is here today for a postoperative visit. Date of Surgery: 22    Patient overall is doing well he reports no significant pain. No changes to the numbness and tingling but pain is improving. No Known Allergies    Current Outpatient Medications   Medication Sig    multivitamins-minerals-lutein (Multivitamin 50 Plus) tab tablet Take  by mouth. omega 3-dha-epa-fish oil 100-160-1,000 mg cap Take  by mouth. cephALEXin (KEFLEX) 500 mg capsule Take 1 Cap by mouth three (3) times daily. MULTIVITAMIN PO Take  by mouth. No current facility-administered medications for this visit.        Social History     Socioeconomic History    Marital status:      Spouse name: Not on file    Number of children: Not on file    Years of education: Not on file    Highest education level: Not on file   Occupational History    Not on file   Tobacco Use    Smoking status: Never    Smokeless tobacco: Never   Substance and Sexual Activity    Alcohol use: No    Drug use: No    Sexual activity: Yes   Other Topics Concern    Not on file   Social History Narrative    Not on file     Social Determinants of Health     Financial Resource Strain: Not on file   Food Insecurity: Not on file   Transportation Needs: Not on file   Physical Activity: Not on file   Stress: Not on file   Social Connections: Not on file   Intimate Partner Violence: Not on file   Housing Stability: Not on file       Past Surgical History:   Procedure Laterality Date    HX KNEE ARTHROSCOPY         Family History   Problem Relation Age of Onset    Cancer Mother         lung    Cancer Father         Prostate            Review of Systems    No flowsheet data found. Vitals:  Ht 5' 10\" (1.778 m)   Wt 225 lb (102.1 kg)   BMI 32.28 kg/m²    Estimated body surface area is 2.25 meters squared as calculated from the following:    Height as of this encounter: 5' 10\" (1.778 m). Weight as of this encounter: 225 lb (102.1 kg). Body mass index is 32.28 kg/m². Physical Exam    Musculoskeletal Exam:    Bilateral upper Extremity Exam:    Evaluation of the patient's postoperative site shows that the incision is intact and healing appropriately. There are no signs of infection, no redness, no warmth, no erythema. There is no purulent drainage noted. Patient fires AIN, PIN and ulnar nerves. Sensation is grossly intact in the median, radial and ulnar distribution. Hand is pink and appears well-perfused. Hand is warm. Elbow range of motion from .   This is bilateral.    Consitutional: Healthy  Skin:   - Edema - mild  - Cellulitis - No    Neuro: Numbness or tingling in R/L arm: Unchanged    Psych: Affect normal    Cardiovascular: Capillary Refill < 2 seconds in upper extremities    Respiratory: Non-Labored Breathing      ROS:    Constitutional: Denies fever/chills    Respiratory: Denies SOB            An electronic signature was used to authenticate this note.   -- Kamila Davis MD

## 2022-07-22 NOTE — LETTER
7/22/2022 10:01 AM    Mr. Brooke Altamirano  [facility] [address]        Mr. Brooke Altamirano was seen in the office today by Dr. Cem Brooks. He is to remain out of work at this time until 9/1/2022 while he is being treated. If there are any questions or concerns, please have the patient reach out to the office at [phone].       Sincerely,      Nii Morales MD

## 2022-07-27 ENCOUNTER — DOCUMENTATION ONLY (OUTPATIENT)
Dept: ORTHOPEDIC SURGERY | Age: 57
End: 2022-07-27

## 2022-08-17 ENCOUNTER — DOCUMENTATION ONLY (OUTPATIENT)
Dept: ORTHOPEDIC SURGERY | Age: 57
End: 2022-08-17

## 2022-09-02 ENCOUNTER — OFFICE VISIT (OUTPATIENT)
Dept: ORTHOPEDIC SURGERY | Age: 57
End: 2022-09-02

## 2022-09-02 VITALS — BODY MASS INDEX: 32.21 KG/M2 | HEIGHT: 70 IN | WEIGHT: 225 LBS

## 2022-09-02 DIAGNOSIS — G56.23 CUBITAL TUNNEL SYNDROME OF BOTH UPPER EXTREMITIES: Primary | ICD-10-CM

## 2022-09-02 DIAGNOSIS — G56.01 RIGHT CARPAL TUNNEL SYNDROME: ICD-10-CM

## 2022-09-02 DIAGNOSIS — G56.02 CARPAL TUNNEL SYNDROME ON LEFT: ICD-10-CM

## 2022-09-02 PROCEDURE — 99024 POSTOP FOLLOW-UP VISIT: CPT | Performed by: ORTHOPAEDIC SURGERY

## 2022-09-02 NOTE — LETTER
NOTIFICATION RETURN TO WORK / SCHOOL    9/2/2022 11:56 AM    Mr. Ele Stapleton  [facility] [address]      To Whom It May Concern:    Ele Stapleton is currently under the care of [facility]. Patient can return to work on 9/6/2022. He has the following restrictions of no pushing or pulling, no lifting greater than 5 pounds, no repetitive activities. This is for the bilateral upper extremities. He will follow-up in 1 month for repeat evaluation. If there are questions or concerns please have the patient contact our office.         Sincerely,      Angela Ortiz MD

## 2022-09-02 NOTE — PROGRESS NOTES
Leah Sams (: 1965) is a 62 y.o. male patient here for evaluation of the following chief complaint(s):  Hand Pain (Bilateral hands)       ASSESSMENT/PLAN:  Below is the assessment and plan developed based on review of pertinent history, physical exam, labs, studies, and medications. 1. Cubital tunnel syndrome of both upper extremities  2. Carpal tunnel syndrome on left  3. Right carpal tunnel syndrome      Patient is doing okay at this time but still has some soreness. The numbness and tingling has not changed. Still having weakness. I again discussed with him given the severity of his symptoms preoperatively that is good to take many months for this to improve and show any signs of nerve recovery in terms of numbness and tingling and strength. Though symptoms may never recover. He will continue physical therapy to improve and follow-up in 1 month for repeat evaluation. He was given updated work restrictions today with light duty and hopefully can progress at the next visit    Patient verbalized understanding and elected to proceed. All questions were answered to the patient's apparent satisfaction. SUBJECTIVE/OBJECTIVE:    Patient is here today for a postoperative visit. Date of Surgery: 22    Patient overall is doing well he reports pain with pushoff. He reports some stiffness in the elbows. No changes to the numbness and tingling. Reports weakness          No Known Allergies    Current Outpatient Medications   Medication Sig    multivitamins-minerals-lutein (Multivitamin 50 Plus) tab tablet Take  by mouth. omega 3-dha-epa-fish oil 100-160-1,000 mg cap Take  by mouth. MULTIVITAMIN PO Take  by mouth. cephALEXin (KEFLEX) 500 mg capsule Take 1 Cap by mouth three (3) times daily. (Patient not taking: Reported on 2022)     No current facility-administered medications for this visit.        Social History     Socioeconomic History    Marital status:      Spouse name: Not on file    Number of children: Not on file    Years of education: Not on file    Highest education level: Not on file   Occupational History    Not on file   Tobacco Use    Smoking status: Never    Smokeless tobacco: Never   Substance and Sexual Activity    Alcohol use: No    Drug use: No    Sexual activity: Yes   Other Topics Concern    Not on file   Social History Narrative    Not on file     Social Determinants of Health     Financial Resource Strain: Not on file   Food Insecurity: Not on file   Transportation Needs: Not on file   Physical Activity: Not on file   Stress: Not on file   Social Connections: Not on file   Intimate Partner Violence: Not on file   Housing Stability: Not on file       Past Surgical History:   Procedure Laterality Date    HX KNEE ARTHROSCOPY         Family History   Problem Relation Age of Onset    Cancer Mother         lung    Cancer Father         Prostate            Review of Systems    No flowsheet data found. Vitals:  Ht 5' 10\" (1.778 m)   Wt 225 lb (102.1 kg)   BMI 32.28 kg/m²    Estimated body surface area is 2.25 meters squared as calculated from the following:    Height as of this encounter: 5' 10\" (1.778 m). Weight as of this encounter: 225 lb (102.1 kg). Body mass index is 32.28 kg/m². Physical Exam    Musculoskeletal Exam:    Bilateral upper Extremity Exam:    Evaluation of the patient's postoperative site shows that the incision is intact and healed appropriately. There are no signs of infection, no redness, no warmth, no erythema. There is no purulent drainage noted. Patient fires AIN, PIN and ulnar nerves. Sensation is grossly intact in the median, radial and ulnar distribution. Hand is pink and appears well-perfused. Hand is warm. Elbow range of motion from .   This is bilateral.    Consitutional: Healthy  Skin:   - Edema - none  - Cellulitis - No    Neuro: Numbness or tingling in R/L arm: Unchanged    Psych: Affect normal    Cardiovascular: Capillary Refill < 2 seconds in upper extremities    Respiratory: Non-Labored Breathing      ROS:    Constitutional: Denies fever/chills    Respiratory: Denies SOB            An electronic signature was used to authenticate this note.   -- Adebayo Lombardi MD

## 2022-09-13 DIAGNOSIS — G56.23 CUBITAL TUNNEL SYNDROME OF BOTH UPPER EXTREMITIES: Primary | ICD-10-CM

## 2022-09-13 DIAGNOSIS — G56.01 RIGHT CARPAL TUNNEL SYNDROME: ICD-10-CM

## 2022-09-13 DIAGNOSIS — G56.02 CARPAL TUNNEL SYNDROME ON LEFT: ICD-10-CM

## 2022-10-14 ENCOUNTER — OFFICE VISIT (OUTPATIENT)
Dept: ORTHOPEDIC SURGERY | Age: 57
End: 2022-10-14
Payer: OTHER MISCELLANEOUS

## 2022-10-14 VITALS — HEIGHT: 70 IN | BODY MASS INDEX: 33.64 KG/M2 | WEIGHT: 235 LBS

## 2022-10-14 DIAGNOSIS — G56.02 CARPAL TUNNEL SYNDROME ON LEFT: ICD-10-CM

## 2022-10-14 DIAGNOSIS — G56.23 CUBITAL TUNNEL SYNDROME OF BOTH UPPER EXTREMITIES: Primary | ICD-10-CM

## 2022-10-14 DIAGNOSIS — G56.01 RIGHT CARPAL TUNNEL SYNDROME: ICD-10-CM

## 2022-10-14 PROCEDURE — 99213 OFFICE O/P EST LOW 20 MIN: CPT | Performed by: ORTHOPAEDIC SURGERY

## 2022-10-14 NOTE — LETTER
10/14/2022 11:29 AM    Mr. Jonah Alex  [facility] [id number]    To Whom It May Concern:     Jonah Alex is currently under the care of [facility].     Patient can return to work on 10/15/2022. He has the following restrictions of no pushing or pulling, no lifting greater than 5 pounds, no repetitive activities. This is for the bilateral upper extremities. He will follow-up in 1 month for repeat evaluation.     If there are questions or concerns please have the patient contact our office.           Sincerely,      Kaden Silva MD

## 2022-10-14 NOTE — PROGRESS NOTES
Sherly Humphreys (: 1965) is a 62 y.o. male patient here for evaluation of the following chief complaint(s):  Hand Pain (Bilateral )       ASSESSMENT/PLAN:  Below is the assessment and plan developed based on review of pertinent history, physical exam, labs, studies, and medications. 1. Cubital tunnel syndrome of both upper extremities  2. Carpal tunnel syndrome on left  3. Right carpal tunnel syndrome      Patient still has not made any significant improvement at this time which we discussed at length given the severity of his symptoms preoperatively. He has finally gotten into physical therapy and hopefully this will help improve his symptoms. Also allow him to build some strength. He is having some paresthesias from the elbow into the hand which is a good sign for his nerve recovery. Follow-up in 2 months for repeat evaluation. We discussed full recovery would be likely at 1 year and given the severity of symptoms preoperatively do not anticipate a complete recovery of all symptoms. Patient verbalized understanding and elected to proceed. All questions were answered to the patient's apparent satisfaction. SUBJECTIVE/OBJECTIVE:    Patient is here today for a postoperative visit. Date of Surgery: 22    Patient is reporting some continued stiffness as well as soreness. No significant changes from his prior visit. No Known Allergies    Current Outpatient Medications   Medication Sig    multivitamins-minerals-lutein (Multivitamin 50 Plus) tab tablet Take  by mouth. omega 3-dha-epa-fish oil 100-160-1,000 mg cap Take  by mouth. MULTIVITAMIN PO Take  by mouth. No current facility-administered medications for this visit.        Social History     Socioeconomic History    Marital status:      Spouse name: Not on file    Number of children: Not on file    Years of education: Not on file    Highest education level: Not on file   Occupational History    Not on file Tobacco Use    Smoking status: Never    Smokeless tobacco: Never   Substance and Sexual Activity    Alcohol use: No    Drug use: No    Sexual activity: Yes   Other Topics Concern    Not on file   Social History Narrative    Not on file     Social Determinants of Health     Financial Resource Strain: Not on file   Food Insecurity: Not on file   Transportation Needs: Not on file   Physical Activity: Not on file   Stress: Not on file   Social Connections: Not on file   Intimate Partner Violence: Not on file   Housing Stability: Not on file       Past Surgical History:   Procedure Laterality Date    HX KNEE ARTHROSCOPY         Family History   Problem Relation Age of Onset    Cancer Mother         lung    Cancer Father         Prostate            Review of Systems    No flowsheet data found. Vitals:  Ht 5' 10\" (1.778 m)   Wt 235 lb (106.6 kg)   BMI 33.72 kg/m²    Estimated body surface area is 2.29 meters squared as calculated from the following:    Height as of this encounter: 5' 10\" (1.778 m). Weight as of this encounter: 235 lb (106.6 kg). Body mass index is 33.72 kg/m². Physical Exam    Musculoskeletal Exam:    Bilateral upper Extremity Exam:    Evaluation of the patient's postoperative site shows that the incision is intact and healed appropriately. There are no signs of infection, no redness, no warmth, no erythema. There is no purulent drainage noted. Patient fires AIN, PIN and ulnar nerves. Sensation is grossly intact in the median, radial and ulnar distribution. Hand is pink and appears well-perfused. Hand is warm. Elbow range of motion from .   This is bilateral.    Consitutional: Healthy  Skin:   - Edema - none  - Cellulitis - No    Neuro: Numbness or tingling in R/L arm: Unchanged    Psych: Affect normal    Cardiovascular: Capillary Refill < 2 seconds in upper extremities    Respiratory: Non-Labored Breathing      ROS:    Constitutional: Denies fever/chills    Respiratory: Denies SOB            An electronic signature was used to authenticate this note.   -- Lizbet Wilde MD

## 2022-11-18 ENCOUNTER — OFFICE VISIT (OUTPATIENT)
Dept: ORTHOPEDIC SURGERY | Age: 57
End: 2022-11-18
Payer: OTHER MISCELLANEOUS

## 2022-11-18 VITALS — WEIGHT: 235 LBS | BODY MASS INDEX: 33.64 KG/M2 | HEIGHT: 70 IN

## 2022-11-18 DIAGNOSIS — G56.23 CUBITAL TUNNEL SYNDROME OF BOTH UPPER EXTREMITIES: Primary | ICD-10-CM

## 2022-11-18 PROCEDURE — 99213 OFFICE O/P EST LOW 20 MIN: CPT | Performed by: ORTHOPAEDIC SURGERY

## 2022-11-18 NOTE — LETTER
NOTIFICATION RETURN TO WORK / SCHOOL    11/18/2022 11:20 AM    Mr. Shruthi Archer [id number]      To Whom It May Concern:    Ali Mantle currently under the care of [facility].     Patient can return to work on 11/18/2022. [facility] has the following restrictions of no pushing or pulling, no lifting greater than 5 pounds, no repetitive activities.  This is for the bilateral upper extremities.  He will follow-up in 1 month for repeat evaluation.     If there are questions or concerns please have the patient contact our office.           Sincerely,      Camden Berger MD

## 2022-11-18 NOTE — PROGRESS NOTES
Susan Kaufman (: 1965) is a 62 y.o. male patient here for evaluation of the following chief complaint(s):  Hand Pain (Bilateral hand pain)       ASSESSMENT/PLAN:  Below is the assessment and plan developed based on review of pertinent history, physical exam, labs, studies, and medications. 1. Cubital tunnel syndrome of both upper extremities    Patient is overall improving slightly with physical therapy however he states his last visit was 2 weeks ago so I gave him a new referral because I think he was improving and could continue to benefit. Follow-up in 2 months for repeat evaluation. We discussed full recovery would be likely at 1 year and given the severity of symptoms preoperatively do not anticipate a complete recovery of all symptoms. Hopefully he can improve his elbow range of motion without needing further intervention. In 2 months if symptoms persist and still very stiff may consider elbow injection. Patient verbalized understanding and elected to proceed. All questions were answered to the patient's apparent satisfaction. SUBJECTIVE/OBJECTIVE:    Patient is here today for a postoperative visit. Date of Surgery: 22    Patient reports physical therapy was helping some. He states that 2 weeks ago was his last visit. No Known Allergies    Current Outpatient Medications   Medication Sig    multivitamins-minerals-lutein (Multivitamin 50 Plus) tab tablet Take  by mouth. omega 3-dha-epa-fish oil 100-160-1,000 mg cap Take  by mouth. MULTIVITAMIN PO Take  by mouth. No current facility-administered medications for this visit.        Social History     Socioeconomic History    Marital status:      Spouse name: Not on file    Number of children: Not on file    Years of education: Not on file    Highest education level: Not on file   Occupational History    Not on file   Tobacco Use    Smoking status: Never    Smokeless tobacco: Never   Vaping Use    Vaping Use: Never used   Substance and Sexual Activity    Alcohol use: No    Drug use: No    Sexual activity: Yes     Partners: Female   Other Topics Concern    Not on file   Social History Narrative    Not on file     Social Determinants of Health     Financial Resource Strain: Not on file   Food Insecurity: Not on file   Transportation Needs: Not on file   Physical Activity: Not on file   Stress: Not on file   Social Connections: Not on file   Intimate Partner Violence: Not on file   Housing Stability: Not on file       Past Surgical History:   Procedure Laterality Date    HX KNEE ARTHROSCOPY         Family History   Problem Relation Age of Onset    Cancer Mother         lung    Cancer Father         Prostate            Review of Systems    No flowsheet data found. Vitals:  Ht 5' 10\" (1.778 m)   Wt 235 lb (106.6 kg)   BMI 33.72 kg/m²    Estimated body surface area is 2.29 meters squared as calculated from the following:    Height as of this encounter: 5' 10\" (1.778 m). Weight as of this encounter: 235 lb (106.6 kg). Body mass index is 33.72 kg/m². Physical Exam    Musculoskeletal Exam:    Bilateral upper Extremity Exam:    Evaluation of the patient's postoperative site shows that the incision is intact and healed appropriately. There are no signs of infection, no redness, no warmth, no erythema. There is no purulent drainage noted. Patient fires AIN, PIN and ulnar nerves. Sensation is grossly intact in the median, radial and ulnar distribution. Hand is pink and appears well-perfused. Hand is warm. Elbow range of motion from .   This is bilateral, not measured today but does not appear to changed    Consitutional: Healthy  Skin:   - Edema - none  - Cellulitis - No    Neuro: Numbness or tingling in R/L arm: Unchanged    Psych: Affect normal    Cardiovascular: Capillary Refill < 2 seconds in upper extremities    Respiratory: Non-Labored Breathing      ROS:    Constitutional: Denies fever/chills    Respiratory: Denies SOB            An electronic signature was used to authenticate this note.   -- Iveth Thapa MD

## 2023-01-06 ENCOUNTER — OFFICE VISIT (OUTPATIENT)
Dept: ORTHOPEDIC SURGERY | Age: 58
End: 2023-01-06
Payer: OTHER MISCELLANEOUS

## 2023-01-06 VITALS — HEIGHT: 70 IN | WEIGHT: 235 LBS | BODY MASS INDEX: 33.64 KG/M2

## 2023-01-06 DIAGNOSIS — G56.01 RIGHT CARPAL TUNNEL SYNDROME: ICD-10-CM

## 2023-01-06 DIAGNOSIS — G56.02 CARPAL TUNNEL SYNDROME ON LEFT: ICD-10-CM

## 2023-01-06 DIAGNOSIS — G56.23 CUBITAL TUNNEL SYNDROME OF BOTH UPPER EXTREMITIES: Primary | ICD-10-CM

## 2023-01-06 PROCEDURE — 99213 OFFICE O/P EST LOW 20 MIN: CPT | Performed by: ORTHOPAEDIC SURGERY

## 2023-01-06 NOTE — PROGRESS NOTES
Melody Montanez (: 1965) is a 62 y.o. male patient here for evaluation of the following chief complaint(s):  Hand Pain (Bilateral hands)       ASSESSMENT/PLAN:  Below is the assessment and plan developed based on review of pertinent history, physical exam, labs, studies, and medications. 1. Cubital tunnel syndrome of both upper extremities  2. Right carpal tunnel syndrome  3. Carpal tunnel syndrome on left    Patient is overall improving with physical therapy and feels like his range of motion and strength is improving somewhat. Recommended continuing physical therapy. I did give him a work note that he could return with restrictions. Follow-up with me in 3 months, not planning any further intervention. We did discuss elbow injection potentially if needed and we also discussed contracture release if needed but overall his range of motion is getting better and functional.    Patient verbalized understanding and elected to proceed. All questions were answered to the patient's apparent satisfaction. SUBJECTIVE/OBJECTIVE:    Patient is here today for a postoperative visit. Date of Surgery: 22    Overall patient's been doing okay and has been back into physical therapy reporting that it is helping. Making slight progress he states          No Known Allergies    Current Outpatient Medications   Medication Sig    multivitamins-minerals-lutein (Multivitamin 50 Plus) tab tablet Take  by mouth. omega 3-dha-epa-fish oil 100-160-1,000 mg cap Take  by mouth. MULTIVITAMIN PO Take  by mouth. No current facility-administered medications for this visit.        Social History     Socioeconomic History    Marital status:      Spouse name: Not on file    Number of children: Not on file    Years of education: Not on file    Highest education level: Not on file   Occupational History    Not on file   Tobacco Use    Smoking status: Never    Smokeless tobacco: Never   Vaping Use    Vaping Use: Never used   Substance and Sexual Activity    Alcohol use: No    Drug use: No    Sexual activity: Yes     Partners: Female   Other Topics Concern    Not on file   Social History Narrative    Not on file     Social Determinants of Health     Financial Resource Strain: Not on file   Food Insecurity: Not on file   Transportation Needs: Not on file   Physical Activity: Not on file   Stress: Not on file   Social Connections: Not on file   Intimate Partner Violence: Not on file   Housing Stability: Not on file       Past Surgical History:   Procedure Laterality Date    HX KNEE ARTHROSCOPY         Family History   Problem Relation Age of Onset    Cancer Mother         lung    Cancer Father         Prostate            Review of Systems    No flowsheet data found. Vitals:  Ht 5' 10\" (1.778 m)   Wt 235 lb (106.6 kg)   BMI 33.72 kg/m²    Estimated body surface area is 2.29 meters squared as calculated from the following:    Height as of this encounter: 5' 10\" (1.778 m). Weight as of this encounter: 235 lb (106.6 kg). Body mass index is 33.72 kg/m². Physical Exam    Musculoskeletal Exam:    Bilateral upper Extremity Exam:    Evaluation of the patient's postoperative site shows that the incision is intact and healed appropriately. There are no signs of infection, no redness, no warmth, no erythema. There is no purulent drainage noted. Patient fires AIN, PIN and ulnar nerves. Sensation is grossly intact in the median, radial and ulnar distribution. Hand is pink and appears well-perfused. Hand is warm.      Range of motion appears slightly better today he can reach behind his head    Consitutional: Healthy  Skin:   - Edema - none  - Cellulitis - No    Neuro: Numbness or tingling in R/L arm: Unchanged    Psych: Affect normal    Cardiovascular: Capillary Refill < 2 seconds in upper extremities    Respiratory: Non-Labored Breathing      ROS:    Constitutional: Denies fever/chills    Respiratory: Denies SOB            An electronic signature was used to authenticate this note.   -- Zahira Sears MD